# Patient Record
Sex: FEMALE | Race: WHITE | NOT HISPANIC OR LATINO | Employment: OTHER | URBAN - METROPOLITAN AREA
[De-identification: names, ages, dates, MRNs, and addresses within clinical notes are randomized per-mention and may not be internally consistent; named-entity substitution may affect disease eponyms.]

---

## 2020-07-29 ENCOUNTER — APPOINTMENT (EMERGENCY)
Dept: RADIOLOGY | Facility: HOSPITAL | Age: 85
DRG: 551 | End: 2020-07-29
Payer: MEDICARE

## 2020-07-29 ENCOUNTER — HOSPITAL ENCOUNTER (INPATIENT)
Facility: HOSPITAL | Age: 85
LOS: 6 days | Discharge: HOME | DRG: 551 | End: 2020-08-05
Attending: EMERGENCY MEDICINE | Admitting: SURGERY
Payer: MEDICARE

## 2020-07-29 DIAGNOSIS — S22.080A COMPRESSION FRACTURE OF T12 VERTEBRA, INITIAL ENCOUNTER (HCC): ICD-10-CM

## 2020-07-29 DIAGNOSIS — S22.42XA CLOSED FRACTURE OF MULTIPLE RIBS OF LEFT SIDE: ICD-10-CM

## 2020-07-29 DIAGNOSIS — J94.8 HYDROPNEUMOTHORAX: ICD-10-CM

## 2020-07-29 DIAGNOSIS — S22.42XA CLOSED FRACTURE OF MULTIPLE RIBS OF LEFT SIDE, INITIAL ENCOUNTER: Primary | ICD-10-CM
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
## 2020-07-29 LAB
ANION GAP SERPL CALCULATED.3IONS-SCNC: 3 MMOL/L (ref 4–13)
BASOPHILS # BLD AUTO: 0.08 THOUSANDS/ΜL (ref 0–0.1)
BASOPHILS NFR BLD AUTO: 1 % (ref 0–1)
BUN SERPL-MCNC: 13 MG/DL (ref 5–25)
CALCIUM SERPL-MCNC: 9 MG/DL (ref 8.3–10.1)
CHLORIDE SERPL-SCNC: 104 MMOL/L (ref 100–108)
CO2 SERPL-SCNC: 32 MMOL/L (ref 21–32)
CREAT SERPL-MCNC: 0.9 MG/DL (ref 0.6–1.3)
EOSINOPHIL # BLD AUTO: 0.39 THOUSAND/ΜL (ref 0–0.61)
EOSINOPHIL NFR BLD AUTO: 4 % (ref 0–6)
ERYTHROCYTE [DISTWIDTH] IN BLOOD BY AUTOMATED COUNT: 13.8 % (ref 11.6–15.1)
GFR SERPL CREATININE-BSD FRML MDRD: 59 ML/MIN/1.73SQ M
GLUCOSE SERPL-MCNC: 112 MG/DL (ref 65–140)
HCT VFR BLD AUTO: 39.7 % (ref 34.8–46.1)
HGB BLD-MCNC: 13.1 G/DL (ref 11.5–15.4)
IMM GRANULOCYTES # BLD AUTO: 0.07 THOUSAND/UL (ref 0–0.2)
IMM GRANULOCYTES NFR BLD AUTO: 1 % (ref 0–2)
LYMPHOCYTES # BLD AUTO: 1.42 THOUSANDS/ΜL (ref 0.6–4.47)
LYMPHOCYTES NFR BLD AUTO: 13 % (ref 14–44)
MCH RBC QN AUTO: 31 PG (ref 26.8–34.3)
MCHC RBC AUTO-ENTMCNC: 33 G/DL (ref 31.4–37.4)
MCV RBC AUTO: 94 FL (ref 82–98)
MONOCYTES # BLD AUTO: 0.76 THOUSAND/ΜL (ref 0.17–1.22)
MONOCYTES NFR BLD AUTO: 7 % (ref 4–12)
NEUTROPHILS # BLD AUTO: 8.07 THOUSANDS/ΜL (ref 1.85–7.62)
NEUTS SEG NFR BLD AUTO: 74 % (ref 43–75)
NRBC BLD AUTO-RTO: 0 /100 WBCS
PLATELET # BLD AUTO: 283 THOUSANDS/UL (ref 149–390)
PMV BLD AUTO: 10.3 FL (ref 8.9–12.7)
POTASSIUM SERPL-SCNC: 4.2 MMOL/L (ref 3.5–5.3)
RBC # BLD AUTO: 4.22 MILLION/UL (ref 3.81–5.12)
SODIUM SERPL-SCNC: 139 MMOL/L (ref 136–145)
WBC # BLD AUTO: 10.79 THOUSAND/UL (ref 4.31–10.16)

## 2020-07-29 PROCEDURE — 80048 BASIC METABOLIC PNL TOTAL CA: CPT | Performed by: EMERGENCY MEDICINE

## 2020-07-29 PROCEDURE — 99284 EMERGENCY DEPT VISIT MOD MDM: CPT | Performed by: EMERGENCY MEDICINE

## 2020-07-29 PROCEDURE — 96375 TX/PRO/DX INJ NEW DRUG ADDON: CPT

## 2020-07-29 PROCEDURE — 1123F ACP DISCUSS/DSCN MKR DOCD: CPT | Performed by: ANESTHESIOLOGY

## 2020-07-29 PROCEDURE — 36415 COLL VENOUS BLD VENIPUNCTURE: CPT | Performed by: EMERGENCY MEDICINE

## 2020-07-29 PROCEDURE — 74177 CT ABD & PELVIS W/CONTRAST: CPT

## 2020-07-29 PROCEDURE — 85025 COMPLETE CBC W/AUTO DIFF WBC: CPT | Performed by: EMERGENCY MEDICINE

## 2020-07-29 PROCEDURE — 99285 EMERGENCY DEPT VISIT HI MDM: CPT

## 2020-07-29 PROCEDURE — 96374 THER/PROPH/DIAG INJ IV PUSH: CPT

## 2020-07-29 PROCEDURE — 71260 CT THORAX DX C+: CPT

## 2020-07-29 RX ORDER — LIDOCAINE 50 MG/G
1 PATCH TOPICAL ONCE
Status: COMPLETED | OUTPATIENT
Start: 2020-07-29 | End: 2020-07-30

## 2020-07-29 RX ORDER — MORPHINE SULFATE 4 MG/ML
4 INJECTION, SOLUTION INTRAMUSCULAR; INTRAVENOUS ONCE
Status: COMPLETED | OUTPATIENT
Start: 2020-07-29 | End: 2020-07-29

## 2020-07-29 RX ORDER — HYDROMORPHONE HCL/PF 1 MG/ML
0.5 SYRINGE (ML) INJECTION ONCE
Status: COMPLETED | OUTPATIENT
Start: 2020-07-29 | End: 2020-07-29

## 2020-07-29 RX ADMIN — IOHEXOL 100 ML: 350 INJECTION, SOLUTION INTRAVENOUS at 23:30

## 2020-07-29 RX ADMIN — HYDROMORPHONE HYDROCHLORIDE 0.5 MG: 1 INJECTION, SOLUTION INTRAMUSCULAR; INTRAVENOUS; SUBCUTANEOUS at 23:09

## 2020-07-29 RX ADMIN — MORPHINE SULFATE 4 MG: 4 INJECTION INTRAVENOUS at 21:47

## 2020-07-29 RX ADMIN — LIDOCAINE 1 PATCH: 50 PATCH CUTANEOUS at 23:09

## 2020-07-30 ENCOUNTER — HOSPITAL ENCOUNTER (INPATIENT)
Dept: SURGERY | Facility: HOSPITAL | Age: 85
Discharge: HOME | DRG: 551 | End: 2020-07-30
Payer: MEDICARE

## 2020-07-30 ENCOUNTER — ANESTHESIA (INPATIENT)
Dept: ANESTHESIOLOGY | Facility: HOSPITAL | Age: 85
DRG: 551 | End: 2020-07-30
Payer: MEDICARE

## 2020-07-30 ENCOUNTER — ANESTHESIA EVENT (INPATIENT)
Dept: ANESTHESIOLOGY | Facility: HOSPITAL | Age: 85
DRG: 551 | End: 2020-07-30
Payer: MEDICARE

## 2020-07-30 PROBLEM — S22.080A CLOSED WEDGE COMPRESSION FRACTURE OF T12 VERTEBRA (HCC): Status: ACTIVE | Noted: 2020-07-30

## 2020-07-30 PROBLEM — G89.11 ACUTE PAIN DUE TO TRAUMA: Status: ACTIVE | Noted: 2020-07-30

## 2020-07-30 PROBLEM — S22.42XA CLOSED FRACTURE OF MULTIPLE RIBS OF LEFT SIDE: Status: ACTIVE | Noted: 2020-07-30
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
## 2020-07-30 PROBLEM — I10 HYPERTENSION: Chronic | Status: ACTIVE | Noted: 2020-07-30

## 2020-07-30 PROBLEM — J94.2 HEMOPNEUMOTHORAX ON LEFT: Status: ACTIVE | Noted: 2020-07-30

## 2020-07-30 LAB
ANION GAP SERPL CALCULATED.3IONS-SCNC: 5 MMOL/L (ref 4–13)
BUN SERPL-MCNC: 13 MG/DL (ref 5–25)
CALCIUM SERPL-MCNC: 9.2 MG/DL (ref 8.3–10.1)
CHLORIDE SERPL-SCNC: 105 MMOL/L (ref 100–108)
CO2 SERPL-SCNC: 27 MMOL/L (ref 21–32)
CREAT SERPL-MCNC: 0.78 MG/DL (ref 0.6–1.3)
ERYTHROCYTE [DISTWIDTH] IN BLOOD BY AUTOMATED COUNT: 13.8 % (ref 11.6–15.1)
GFR SERPL CREATININE-BSD FRML MDRD: 70 ML/MIN/1.73SQ M
GLUCOSE SERPL-MCNC: 128 MG/DL (ref 65–140)
HCT VFR BLD AUTO: 32.4 % (ref 34.8–46.1)
HGB BLD-MCNC: 10.8 G/DL (ref 11.5–15.4)
MCH RBC QN AUTO: 31.5 PG (ref 26.8–34.3)
MCHC RBC AUTO-ENTMCNC: 33.3 G/DL (ref 31.4–37.4)
MCV RBC AUTO: 95 FL (ref 82–98)
PLATELET # BLD AUTO: 202 THOUSANDS/UL (ref 149–390)
PMV BLD AUTO: 10.1 FL (ref 8.9–12.7)
POTASSIUM SERPL-SCNC: 4.4 MMOL/L (ref 3.5–5.3)
RBC # BLD AUTO: 3.43 MILLION/UL (ref 3.81–5.12)
SARS-COV-2 RNA RESP QL NAA+PROBE: NEGATIVE
SODIUM SERPL-SCNC: 137 MMOL/L (ref 136–145)
WBC # BLD AUTO: 7.82 THOUSAND/UL (ref 4.31–10.16)

## 2020-07-30 PROCEDURE — 85027 COMPLETE CBC AUTOMATED: CPT | Performed by: SURGERY

## 2020-07-30 PROCEDURE — 99222 1ST HOSP IP/OBS MODERATE 55: CPT | Performed by: NURSE PRACTITIONER

## 2020-07-30 PROCEDURE — 3E0R3BZ INTRODUCTION OF ANESTHETIC AGENT INTO SPINAL CANAL, PERCUTANEOUS APPROACH: ICD-10-PCS | Performed by: SURGERY

## 2020-07-30 PROCEDURE — 99222 1ST HOSP IP/OBS MODERATE 55: CPT | Performed by: INTERNAL MEDICINE

## 2020-07-30 PROCEDURE — U0003 INFECTIOUS AGENT DETECTION BY NUCLEIC ACID (DNA OR RNA); SEVERE ACUTE RESPIRATORY SYNDROME CORONAVIRUS 2 (SARS-COV-2) (CORONAVIRUS DISEASE [COVID-19]), AMPLIFIED PROBE TECHNIQUE, MAKING USE OF HIGH THROUGHPUT TECHNOLOGIES AS DESCRIBED BY CMS-2020-01-R: HCPCS | Performed by: SURGERY

## 2020-07-30 PROCEDURE — 99222 1ST HOSP IP/OBS MODERATE 55: CPT | Performed by: SURGERY

## 2020-07-30 PROCEDURE — 90715 TDAP VACCINE 7 YRS/> IM: CPT | Performed by: SURGERY

## 2020-07-30 PROCEDURE — 80048 BASIC METABOLIC PNL TOTAL CA: CPT | Performed by: SURGERY

## 2020-07-30 PROCEDURE — 36415 COLL VENOUS BLD VENIPUNCTURE: CPT | Performed by: SURGERY

## 2020-07-30 PROCEDURE — 94760 N-INVAS EAR/PLS OXIMETRY 1: CPT

## 2020-07-30 PROCEDURE — NC001 PR NO CHARGE: Performed by: SURGERY

## 2020-07-30 PROCEDURE — 00HU33Z INSERTION OF INFUSION DEVICE INTO SPINAL CANAL, PERCUTANEOUS APPROACH: ICD-10-PCS | Performed by: SURGERY

## 2020-07-30 RX ORDER — GABAPENTIN 100 MG/1
100 CAPSULE ORAL
Status: DISCONTINUED | OUTPATIENT
Start: 2020-07-30 | End: 2020-07-31

## 2020-07-30 RX ORDER — HYDROMORPHONE HCL/PF 1 MG/ML
0.2 SYRINGE (ML) INJECTION EVERY 4 HOURS PRN
Status: DISCONTINUED | OUTPATIENT
Start: 2020-07-30 | End: 2020-08-05 | Stop reason: HOSPADM

## 2020-07-30 RX ORDER — HEPARIN SODIUM 5000 [USP'U]/ML
5000 INJECTION, SOLUTION INTRAVENOUS; SUBCUTANEOUS EVERY 8 HOURS SCHEDULED
Status: DISPENSED | OUTPATIENT
Start: 2020-07-30 | End: 2020-08-02

## 2020-07-30 RX ORDER — DIPHENHYDRAMINE HYDROCHLORIDE 50 MG/ML
12.5 INJECTION INTRAMUSCULAR; INTRAVENOUS EVERY 6 HOURS PRN
Status: DISCONTINUED | OUTPATIENT
Start: 2020-07-30 | End: 2020-08-03

## 2020-07-30 RX ORDER — LIDOCAINE HYDROCHLORIDE AND EPINEPHRINE 15; 5 MG/ML; UG/ML
INJECTION, SOLUTION EPIDURAL AS NEEDED
Status: DISCONTINUED | OUTPATIENT
Start: 2020-07-30 | End: 2020-07-30 | Stop reason: HOSPADM

## 2020-07-30 RX ORDER — CELECOXIB 200 MG/1
200 CAPSULE ORAL 2 TIMES DAILY
Status: DISCONTINUED | OUTPATIENT
Start: 2020-07-30 | End: 2020-08-05 | Stop reason: HOSPADM

## 2020-07-30 RX ORDER — MIDAZOLAM HYDROCHLORIDE 2 MG/2ML
INJECTION, SOLUTION INTRAMUSCULAR; INTRAVENOUS
Status: DISPENSED
Start: 2020-07-30 | End: 2020-07-31

## 2020-07-30 RX ORDER — HYDROMORPHONE HCL/PF 1 MG/ML
0.2 SYRINGE (ML) INJECTION EVERY 4 HOURS PRN
Status: DISCONTINUED | OUTPATIENT
Start: 2020-07-30 | End: 2020-07-30

## 2020-07-30 RX ORDER — OXYCODONE HYDROCHLORIDE 5 MG/1
5 TABLET ORAL EVERY 4 HOURS PRN
Status: DISCONTINUED | OUTPATIENT
Start: 2020-07-30 | End: 2020-07-30

## 2020-07-30 RX ORDER — ACETAMINOPHEN 325 MG/1
650 TABLET ORAL EVERY 6 HOURS PRN
Status: DISCONTINUED | OUTPATIENT
Start: 2020-07-30 | End: 2020-08-03

## 2020-07-30 RX ORDER — SODIUM CHLORIDE, SODIUM LACTATE, POTASSIUM CHLORIDE, CALCIUM CHLORIDE 600; 310; 30; 20 MG/100ML; MG/100ML; MG/100ML; MG/100ML
INJECTION, SOLUTION INTRAVENOUS CONTINUOUS PRN
Status: DISCONTINUED | OUTPATIENT
Start: 2020-07-30 | End: 2020-07-30 | Stop reason: HOSPADM

## 2020-07-30 RX ORDER — FENTANYL CITRATE 50 UG/ML
INJECTION, SOLUTION INTRAMUSCULAR; INTRAVENOUS AS NEEDED
Status: DISCONTINUED | OUTPATIENT
Start: 2020-07-30 | End: 2020-07-30 | Stop reason: HOSPADM

## 2020-07-30 RX ORDER — ATORVASTATIN CALCIUM 20 MG/1
20 TABLET, FILM COATED ORAL
Status: DISCONTINUED | OUTPATIENT
Start: 2020-07-30 | End: 2020-08-05 | Stop reason: HOSPADM

## 2020-07-30 RX ORDER — METHOCARBAMOL 500 MG/1
500 TABLET, FILM COATED ORAL ONCE
Status: COMPLETED | OUTPATIENT
Start: 2020-07-30 | End: 2020-07-31

## 2020-07-30 RX ORDER — HYDROMORPHONE HCL/PF 1 MG/ML
1 SYRINGE (ML) INJECTION ONCE
Status: COMPLETED | OUTPATIENT
Start: 2020-07-30 | End: 2020-07-30

## 2020-07-30 RX ORDER — AMLODIPINE BESYLATE 5 MG/1
5 TABLET ORAL DAILY
Status: DISCONTINUED | OUTPATIENT
Start: 2020-07-30 | End: 2020-08-05 | Stop reason: HOSPADM

## 2020-07-30 RX ORDER — FENTANYL CITRATE 50 UG/ML
INJECTION, SOLUTION INTRAMUSCULAR; INTRAVENOUS
Status: COMPLETED
Start: 2020-07-30 | End: 2020-07-30

## 2020-07-30 RX ORDER — OXYCODONE HCL 5 MG/5 ML
2.5 SOLUTION, ORAL ORAL EVERY 4 HOURS PRN
Status: DISCONTINUED | OUTPATIENT
Start: 2020-07-30 | End: 2020-07-30

## 2020-07-30 RX ORDER — ROPIVACAINE HYDROCHLORIDE 2 MG/ML
INJECTION, SOLUTION EPIDURAL; INFILTRATION; PERINEURAL AS NEEDED
Status: DISCONTINUED | OUTPATIENT
Start: 2020-07-30 | End: 2020-07-30 | Stop reason: HOSPADM

## 2020-07-30 RX ADMIN — CELECOXIB 200 MG: 200 CAPSULE ORAL at 21:40

## 2020-07-30 RX ADMIN — ATORVASTATIN CALCIUM 20 MG: 20 TABLET, FILM COATED ORAL at 21:39

## 2020-07-30 RX ADMIN — OXYCODONE HYDROCHLORIDE 5 MG: 5 TABLET ORAL at 12:08

## 2020-07-30 RX ADMIN — GABAPENTIN 100 MG: 100 CAPSULE ORAL at 21:39

## 2020-07-30 RX ADMIN — TETANUS TOXOID, REDUCED DIPHTHERIA TOXOID AND ACELLULAR PERTUSSIS VACCINE, ADSORBED 0.5 ML: 5; 2.5; 8; 8; 2.5 SUSPENSION INTRAMUSCULAR at 04:44

## 2020-07-30 RX ADMIN — HYDROMORPHONE HYDROCHLORIDE 1 MG: 1 INJECTION, SOLUTION INTRAMUSCULAR; INTRAVENOUS; SUBCUTANEOUS at 02:03

## 2020-07-30 RX ADMIN — FENTANYL CITRATE 50 MCG: 50 INJECTION, SOLUTION INTRAMUSCULAR; INTRAVENOUS at 12:40

## 2020-07-30 RX ADMIN — HYDROMORPHONE HYDROCHLORIDE 0.2 MG: 1 INJECTION, SOLUTION INTRAMUSCULAR; INTRAVENOUS; SUBCUTANEOUS at 09:52

## 2020-07-30 RX ADMIN — HEPARIN SODIUM 5000 UNITS: 5000 INJECTION INTRAVENOUS; SUBCUTANEOUS at 21:40

## 2020-07-30 RX ADMIN — DIPHENHYDRAMINE HYDROCHLORIDE 12.5 MG: 50 INJECTION, SOLUTION INTRAMUSCULAR; INTRAVENOUS at 17:42

## 2020-07-30 RX ADMIN — ROPIVACAINE HYDROCHLORIDE 5 ML: 2 INJECTION, SOLUTION EPIDURAL; INFILTRATION at 13:05

## 2020-07-30 RX ADMIN — Medication: at 15:47

## 2020-07-30 RX ADMIN — SODIUM CHLORIDE, SODIUM LACTATE, POTASSIUM CHLORIDE, AND CALCIUM CHLORIDE: .6; .31; .03; .02 INJECTION, SOLUTION INTRAVENOUS at 12:35

## 2020-07-30 RX ADMIN — LIDOCAINE HYDROCHLORIDE AND EPINEPHRINE 3 ML: 15; 5 INJECTION, SOLUTION EPIDURAL at 13:00

## 2020-07-30 NOTE — H&P
H&P Exam - Trauma   Herbert Meneses 80 y o  female MRN: [id number]  Unit/Bed#: ED 26 Encounter: [id number]    Assessment/Plan   Trauma Alert: Evaluation  Model of Arrival: Ambulance  Trauma Team: Attending Hector Subramanian, Residents Caridad Page and Fellow Joe Corcoran  Consultants: Neurosurgery, Adult Pain Service, Geriatrics  Trauma Active Problems:     Rib fractures'  -patient has multiple displaced rib fractures, in adjacent ribs  -notes pain with deep inspiration-maximum incentive spirometry of 500  -patient chronically takes pain medication  -Patient on occasional aspirin    Compression fracture of T12 vertebra  -presumed traumatic in secondary to same fall and sedated rib fracture  -minimal back pain  -no bony tenderness on palpation her spine    Hydropneumothorax  -stable small collection noted      Trauma Plan:   -will admit to step-down level 2  -Multimodal pain regimen for rib fractures  -will consult acute pain service, recommendations appreciated  -encourage incentive spirometry  -will monitor lung function  -a m  Chest x-ray to assess lung volumes and progression of hydropneumothorax  -no need for chest tube at this time  -neuro surgery to be consulted for spinal compression fracture    Chief Complaint:  Chest wall pain with respiration    History of Present Illness   HPI:  Herbert Meneses is a 80 y o  female who presents via EMS after falling today at her daughter's home  She states that she has been home bound for the past 2 years, and was visiting her daughter  She routinely walks with ambulatory aid from a cane, but made no attempt to go to the bathroom without use of her cane, at which point she fell down a number of steps and then complained of left-sided chest pain  She notes that the pain is worse with respiration  She states that she did not feel dizzy or lightheaded at the time of her fall, did not strike her head and did not lose consciousness    She does take baby aspirin fairly routinely, but no other blood thinning medications  She has a history of gabapentin use and occasionally takes tramadol as well  Presently she is alert oriented and able to relate the details of her medical history and the events surrounding this fall clearly to me  Her daughter was also present at the time that she fell and corroborates her account of the fall  Mechanism:Fall    Review of Systems   Respiratory:        Chest pain with respiration   Cardiovascular: Positive for leg swelling  Musculoskeletal: Positive for arthralgias and gait problem  12-point, complete review of systems was reviewed and negative except as stated above  Historical Information   The patient is a reliable historian, and her daughter is also present at bedside relate some details the events surrounding her fall  History reviewed  No pertinent past medical history  Past Surgical History:   Procedure Laterality Date    JOINT REPLACEMENT       Social History   Social History     Substance and Sexual Activity   Alcohol Use Never    Frequency: Never     Social History     Substance and Sexual Activity   Drug Use Never     Social History     Tobacco Use   Smoking Status Never Smoker   Smokeless Tobacco Never Used     E-Cigarette/Vaping    E-Cigarette Use Never User      E-Cigarette/Vaping Substances       There is no immunization history on file for this patient    Last Tetanus:  Patient unaware of last Tdap  Family History: Non-contributory  Unable to obtain/limited by not limited      Meds/Allergies   all current active meds have been reviewed    No Known Allergies      PHYSICAL EXAM    PE limited by:  Not limited    Objective   Vitals:   First set: Temperature: 97 7 °F (36 5 °C) (07/29/20 2101)  Pulse: 89 (07/29/20 2101)  Respirations: 18 (07/29/20 2101)  Blood Pressure: 129/89 (07/29/20 2101)    Primary Survey:   (A) Airway:  Intact  (B) Breathing:  Bilateral breath sounds  (C) Circulation: Pulses:   carotid  4/4, pedal  4/4, radial 4/4 and femoral  4/4  (D) Disabliity:  GCS Total:  15  (E) Expose:  Completed    Secondary Survey: (Click on Physical Exam tab above)  Physical Exam   Constitutional: She is oriented to person, place, and time  She appears well-developed  No distress  HENT:   Head: Normocephalic and atraumatic  Eyes: Pupils are equal, round, and reactive to light  EOM are normal    Neck: Normal range of motion  Neck supple  Cardiovascular: Normal rate, regular rhythm and normal heart sounds  No murmur heard  Pulses:       Radial pulses are 2+ on the right side, and 2+ on the left side  Femoral pulses are 2+ on the right side, and 2+ on the left side  Popliteal pulses are 2+ on the right side, and 2+ on the left side  Dorsalis pedis pulses are 1+ on the right side, and 1+ on the left side  Pulmonary/Chest: Effort normal and breath sounds normal  No respiratory distress  No evident chest wall bruising    Chest wall tender to palpation    Abdominal: Soft  She exhibits no distension  There is no tenderness  Neurological: She is alert and oriented to person, place, and time  No cranial nerve deficit or sensory deficit  Coordination normal    Skin: Skin is warm and dry  Capillary refill takes 2 to 3 seconds  Lesion noted  Numerous seborrheic keratoses of the trunk       Psychiatric: She has a normal mood and affect         Invasive Devices     Peripheral Intravenous Line            Peripheral IV 07/29/20 Right Antecubital less than 1 day                 Lab Results:   BMP/CMP:   Lab Results   Component Value Date    SODIUM 139 07/29/2020    K 4 2 07/29/2020     07/29/2020    CO2 32 07/29/2020    BUN 13 07/29/2020    CREATININE 0 90 07/29/2020    CALCIUM 9 0 07/29/2020    EGFR 59 07/29/2020   , CBC:   Lab Results   Component Value Date    WBC 10 79 (H) 07/29/2020    HGB 13 1 07/29/2020    HCT 39 7 07/29/2020    MCV 94 07/29/2020     07/29/2020    MCH 31 0 07/29/2020    MCHC 33 0 07/29/2020 RDW 13 8 07/29/2020    MPV 10 3 07/29/2020    NRBC 0 07/29/2020   , Coagulation: No results found for: PT, INR, APTT, Lactate: No results found for: LACTATE, Amylase: No results found for: AMYLASE, Lipase: No results found for: LIPASE, AST: No results found for: AST, ALT: No results found for: ALT, Urinalysis: No results found for: Aziza Blake, SPECGRAV, PHUR, LEUKOCYTESUR, NITRITE, PROTEINUA, GLUCOSEU, KETONESU, BILIRUBINUR, BLOODU, CK: No results found for: CKTOTAL, Troponin: No results found for: TROPONINI and ISTAT: No components found for: VBG  Imaging/EKG Studies: Results: I have personally reviewed pertinent reports  and I have personally reviewed pertinent films in PACS   Ct Chest Abdomen Pelvis W Contrast    Result Date: 7/29/2020  Impression: Tiny left-sided hydropneumothorax  Multiple left-sided rib fractures as described above   Compression deformity of T12 vertebral body  I personally discussed this study with Dr Mamta López on 7/29/2020 at 11:50 PM  Workstation performed: [id number]     Other Studies:  None    Code Status: No Order  Advance Directive and Living Will:      Power of :    POLST:

## 2020-07-30 NOTE — DISCHARGE INSTRUCTIONS
Traumatic Rib Fracture Discharge Instructions: Activity:  - PT and OT evaluation and treatment as indicated  - Walking and normal light activities are encouraged  Normal daily activities including climbing steps are okay  - Avoid lifting greater than 10 pounds, any strenuous activities and/or exercise, and contact sports until cleared by the trauma service  - Continue using the incentive spirometer at least 10 times every hour while awake  Medications:    - You may resume all of your regular medications, including blood thinners and aspirin, after discharge unless otherwise instructed  Please refer to your discharge medication list for further details  - Please take the pain medications as directed  - You are encouraged to use non-narcotic pain medications first and whenever possible  Reserve the use of narcotic pain medication for moderate to severe pain not controlled by non-narcotic medications   - No driving while taking narcotic pain medications  - You may become constipated, especially if taking pain medications  You may take any over the counter stool softeners or laxatives as needed  Examples: Milk of Magnesia, Colace, Senna  Additional Instructions:  - If you have any questions or concerns after discharge please call the office   - Call office or return to ER if fever greater than 101, chills, worsening/uncontrollable pain, develop productive cough, increasing shortness of breath, and/or difficulty breathing  Neurosurgery discharge instructions following spine fracture:      TLSO brace to be worn when out of bed of head of bed greater than 45 degrees  May place brace on while sitting on edge of bed  May be removed for showering   No bending, twisting or heavy lifting  No strenuous activities  No Driving   Follow-up as scheduled in two weeks with repeat spine x-rays to be completed 2-3 days prior to visit  Prescription has been entered electronically        **Please notify MD immediately if you have increased back or leg pain  New numbness, tingling and/or weakness in your legs  **

## 2020-07-30 NOTE — PLAN OF CARE
Problem: Potential for Falls  Goal: Patient will remain free of falls  Description  INTERVENTIONS:  - Assess patient frequently for physical needs  -  Identify cognitive and physical deficits and behaviors that affect risk of falls    -  [place] fall precautions as indicated by assessment   - Educate patient/family on patient safety including physical limitations  - Instruct patient to call for assistance with activity based on assessment  - Modify environment to reduce risk of injury  - Consider OT/PT consult to assist with strengthening/mobility  Outcome: Progressing     Problem: PAIN - ADULT  Goal: Verbalizes/displays adequate comfort level or baseline comfort level  Description  Interventions:  - Encourage patient to monitor pain and request assistance  - Assess pain using appropriate pain scale  - Administer analgesics based on type and severity of pain and evaluate response  - Implement non-pharmacological measures as appropriate and evaluate response  - Consider cultural and social influences on pain and pain management  - Notify physician/advanced practitioner if interventions unsuccessful or patient reports new pain  Outcome: Progressing     Problem: INFECTION - ADULT  Goal: Absence or prevention of progression during hospitalization  Description  INTERVENTIONS:  - Assess and monitor for signs and symptoms of infection  - Monitor lab/diagnostic results  - Monitor all insertion sites, i e  indwelling lines, tubes, and drains  - Monitor endotracheal if appropriate and nasal secretions for changes in amount and color  - [place] appropriate cooling/warming therapies per order  - Administer medications as ordered  - Instruct and encourage patient and family to use good hand hygiene technique  - Identify and instruct in appropriate isolation precautions for identified infection/condition  Outcome: Progressing  Goal: Absence of fever/infection during neutropenic period  Description  INTERVENTIONS:  - Monitor WBC    Outcome: Progressing     Problem: SAFETY ADULT  Goal: Maintain or return to baseline ADL function  Description  INTERVENTIONS:  -  Assess patient's ability to carry out ADLs; assess patient's baseline for ADL function and identify physical deficits which impact ability to perform ADLs (bathing, care of mouth/teeth, toileting, grooming, dressing, etc )  - Assess/evaluate cause of self-care deficits   - Assess range of motion  - Assess patient's mobility; develop plan if impaired  - Assess patient's need for assistive devices and provide as appropriate  - Encourage maximum independence but intervene and supervise when necessary  - Involve family in performance of ADLs  - Assess for home care needs following discharge   - Consider OT consult to assist with ADL evaluation and planning for discharge  - Provide patient education as appropriate  Outcome: Progressing  Goal: Maintain or return mobility status to optimal level  Description  INTERVENTIONS:  - Assess patient's baseline mobility status (ambulation, transfers, stairs, etc )    - Identify cognitive and physical deficits and behaviors that affect mobility  - Identify mobility aids required to assist with transfers and/or ambulation (gait belt, sit-to-stand, lift, walker, cane, etc )  - [place] fall precautions as indicated by assessment  - Record patient progress and toleration of activity level on Mobility SBAR; progress patient to next Phase/Stage  - Instruct patient to call for assistance with activity based on assessment  - Consider rehabilitation consult to assist with strengthening/weightbearing, etc   Outcome: Progressing     Problem: DISCHARGE PLANNING  Goal: Discharge to home or other facility with appropriate resources  Description  INTERVENTIONS:  - Identify barriers to discharge w/patient and caregiver  - Arrange for needed discharge resources and transportation as appropriate  - Identify discharge learning needs (meds, wound care, etc )  - Arrange for interpretive services to assist at discharge as needed  - Refer to Case Management Department for coordinating discharge planning if the patient needs post-hospital services based on physician/advanced practitioner order or complex needs related to functional status, cognitive ability, or social support system  Outcome: Progressing     Problem: Knowledge Deficit  Goal: Patient/family/caregiver demonstrates understanding of disease process, treatment plan, medications, and discharge instructions  Description  Complete learning assessment and assess knowledge base    Interventions:  - Provide teaching at level of understanding  - Provide teaching via preferred learning methods  Outcome: Progressing

## 2020-07-30 NOTE — ASSESSMENT & PLAN NOTE
S/p mechanical fall down 1 step striking her back on a piece of furniture  Patient reports taking baby aspirin daily but has roughly stopped for a week  Imaging:    CT chest abdomen pelvis 07/29/2020:Tiny left-sided hydropneumothorax  Multiple left-sided rib fractures as described above  Compression deformity of T12 vertebral body  Plan:  · Continue neurological exam  · Reviewed imaging with patient and daughter at bedside  · TLSO brace ordered to be worn when OOB or HOB >45 degrees, if patient unable to tolerate can wear brace prn for comfort secondary to multiple left-sided rib fractures  · Ordered upright thoracolumbar spine XR AP/lateral  · No neurosurgical intervention warranted at this time  · Medical management and pain control per primary team  · Mobilize patient as tolerated after fitted for a brace  · PT/OT eval  · DVT PPX: SCDs and cleared from neurosurgical standpoint for pharmacological DVT prophylaxis    Neurosurgery will continue to follow, pending thoracolumbar spine x-ray, call with any questions or concerns

## 2020-07-30 NOTE — RESPIRATORY THERAPY NOTE
RT Protocol Note  Herbert Meneses 80 y o  female MRN: [id number]  Unit/Bed#: [facility] Encounter: [id number]    Assessment    Principal Problem:    Closed fracture of multiple ribs of left side  Active Problems:    Fall    Hemopneumothorax on left    Closed wedge compression fracture of T12 vertebra (HCC)    Acute pain due to trauma    Hypertension      Home Pulmonary Medications:  n/a       Past Medical History:   Diagnosis Date    Hypercholesteremia     Hypertension     Spinal stenosis      Social History     Socioeconomic History    Marital status: /Civil Union     Spouse name: None    Number of children: None    Years of education: None    Highest education level: None   Occupational History    None   Social Needs    Financial resource strain: None    Food insecurity:     Worry: None     Inability: None    Transportation needs:     Medical: None     Non-medical: None   Tobacco Use    Smoking status: Never Smoker    Smokeless tobacco: Never Used   Substance and Sexual Activity    Alcohol use: Never     Frequency: Never    Drug use: Never    Sexual activity: None   Lifestyle    Physical activity:     Days per week: None     Minutes per session: None    Stress: None   Relationships    Social connections:     Talks on phone: None     Gets together: None     Attends [employer] service: None     Active member of club or organization: None     Attends meetings of clubs or organizations: None     Relationship status: None    Intimate partner violence:     Fear of current or ex partner: None     Emotionally abused: None     Physically abused: None     Forced sexual activity: None   Other Topics Concern    None   Social History Narrative    None       Subjective         Objective    Physical Exam:        Vitals:  Blood pressure 139/74, pulse 76, temperature 97 7 °F (36 5 °C), temperature source Oral, resp  rate 16, height 5' 6" (1 676 m), weight 54 4 kg (120 lb), SpO2 97 %            Imaging and other studies: I have personally reviewed pertinent reports  Plan       Airway Clearance Plan: Incentive Spirometer     Resp Comments: Pt with multiple closed rib fractures of the left side, will require IS for airway clearance  Will d/c positive expiratory pressure PEP since there is no indication for that therapy modality and will start Incentive Spirometry instead

## 2020-07-30 NOTE — ORTHOTIC NOTE
Orthotic Note            Date: 7/30/2020      Patient Name: Jamie Aguayo            Reason for Consult:  Patient Active Problem List   Diagnosis    Fall    Closed fracture of multiple ribs of left side    Hemopneumothorax on left    Closed wedge compression fracture of T12 vertebra (HCC)    Acute pain due to trauma    Hypertension     [place] 456 Backpack TLSO    I measured, fit, and delivered a Large TLSO while patient was supine in bed  Pt pain was not controlled and prevented donning the brace  Instructions/adjustments reviewed while family present in room x's 3  [employer] will continue daily follow up  RN aware and Madonna Ruelas will continue daily follow up  Recommendations:  Please call Mobility Coordinator at ext  [phone] in regards to bracing instruction and/or adjustment    Zulma Zheng Restorative Technician, BS

## 2020-07-30 NOTE — ASSESSMENT & PLAN NOTE
- Small left-sided hemopneumothorax, present on admission   - No intervention necessary at this time, and no intervention anticipated  - Await repeat chest x-ray today for further evaluation of small left-sided hemopneumothorax  - Management of rib fractures as noted

## 2020-07-30 NOTE — CONSULTS
Rib Fracture Consultation - Acute Pain Service   Adarsh Burns 80 y o  female MRN: [id number]  Unit/Bed#: ED 26 Encounter: [id number]               Assessment/Plan     Assessment:      Adarsh Burns is a 80y o  year old female with past medical history significant for hypertension, hyperlipidemia, osteoarthritis with multiple hip surgeries and femoral extension on chronic therapy with tramadol and Celebrex who presents after fall down 1 stair, landing on furniture leading to multiple rib fractures and T12 compression injury  Acute pain service consulted for evaluation of rib fracture and consideration for epidural   Risks and benefits discussed with patient and daughter and patient is agreeable to move forward with epidural placement  Plan:   - Acetaminophen 650 mg p o  Q 6 hours p r n  For mild pain  - Oxycodone 2 5/5 mg p o  Q 4 hours p r n  For moderate/severe pain  - Hydromorphone 0 2 mg IV q 4 hours p r n  for breakthrough pain  - Gabapentin 100 mg p o  HS  - Lidocaine patch  - recommend restarting home Celebrex 100 mg p o  B i d  - ok per anesthesiology once epidural placed  - would not order home tramadol  - will move forward with epidural placement     APS will continue to follow  Please call  / [phone] or [facility] (/ between [phone] and on weekends) with questions or concerns    Plan discussed with primary team    History of Present Illness    Admit Date:  7/29/2020  Hospital Day:  0 days  Primary Service:  Emergency Medicine  Attending Provider:  Simone Farias MD  Reason for Consult / Principal Problem: Acute/Chronic Pain  HPI: Adarsh Burns is a 80 y o  female with past medical history significant for hypertension, hyperlipidemia, osteoarthritis with multiple hip surgeries and femoral extension who initially presented to [facility] on 07/29/2020 via EMS after fall  Patient had been  staying with her daughter   She uses a cane to ambulate at baseline  She reportedly did not use her cane and fell down several stairs on her way to the bathroom  On presentation, she complained of L sided CP that is worse with inspiration  She is on ASA intermittently though she states she has not had this for 1 week  Since arrival to hospital, patient has remained hemodynamically stable and afebrile  She has been maintained on 3 L oxygen by nasal cannula and has been saturating in the 90s  Lab significant for WBC 10 79, creatinine 0 90  Patient does not have INR/PTT on file  CT chest abdomen pelvis with contrast demonstrated tiny left-sided pneumothorax and multiple left-sided rib fractures including comminuted displaced fractures of the posterior left 7th through 11th ribs and mildly displaced fractures of the left lateral 5th through 8th ribs  Also noted was a compression deformity of T12 vertebral body  On trauma evaluation, patient's maximum incentive spirometry was 500 cc  Acute pain service consulted for evaluation of rib fracture and evaluation for possible epidural placement  She has not received DVT prophylaxis today  States she has been on Celebrex 100 mg p o  B i d  since 1999  States that she takes tramadol home as needed but only takes this sparingly  She refers that she drinks approximately 1 small glass of wine daily though she denies any withdrawal symptoms such as the shakes or seizures  She does not smoke cigarettes  No illicit drug use  Granddaughter with history of blood clot      Rib Fracture Evaluation:  Injuries:  Multiple rib fractures as outlined above and T12 vertebral body compression deformity  Chest tube:  None  Respiratory Co-morbidities: NA  SpO2:   SPO2 RA Rest      ED from 7/29/2020 in [facility] Emergency Department   SpO2  98 %   SpO2 Activity  At Rest   O2 Device  None (Room air)   O2 Flow Rate          Incentive Spirometer: 600 mL  Platelet Count:   Results from last 7 days   Lab Units 07/29/20  3061 PLATELETS Thousands/uL 283     Coags: not yet performed  Home anticoagulants: On aspirin though she is not taking this for 1 week  DVT prophylaxis: none last dose NA    Current pain location(s):  Left posterior chest wall  Pain Scale: 9/10  Quality:  Throbbing and shooting  Current Analgesic regimen:    Acetaminophen 650 mg p o  Q 6 hours p r n  For mild pain  Oxycodone 2 5/5 mg p o  Q 4 hours p r n  For moderate/severe pain  Hydromorphone 0 2 mg IV q 4 hours p r n  for breakthrough pain  Gabapentin 100 mg p o  HS  Lidocaine patch    Pain History:   Pain Management Provider:      I have reviewed the patient's controlled substance dispensing history in the Prescription Drug Monitoring Program in compliance with the [facility] regulations before prescribing any controlled substances  Inpatient consult to Acute Pain Service  Consult performed by: Naomi De La Vega MD  Consult ordered by: Wilkie Leventhal, MD          Review of Systems   Constitutional: Negative for appetite change, chills, diaphoresis, fatigue, fever and unexpected weight change  HENT: Negative for sore throat  Eyes: Negative for visual disturbance  Respiratory: Negative for cough, chest tightness, shortness of breath and wheezing  Inability to take deep breaths, pain with inspiration, poor cough   Cardiovascular: Negative for chest pain, palpitations and leg swelling  Gastrointestinal: Negative for abdominal distention, abdominal pain, blood in stool, constipation, diarrhea, nausea and vomiting  Genitourinary: Negative for difficulty urinating, flank pain and urgency  Musculoskeletal: Positive for arthralgias, back pain and myalgias  Skin: Negative for pallor and rash  Neurological: Negative for dizziness, weakness, light-headedness and headaches  Historical Information   History reviewed  No pertinent past medical history    Past Surgical History:   Procedure Laterality Date    JOINT REPLACEMENT       Social History Social History     Substance and Sexual Activity   Alcohol Use Never    Frequency: Never     Social History     Substance and Sexual Activity   Drug Use Never     Social History     Tobacco Use   Smoking Status Never Smoker   Smokeless Tobacco Never Used     Family History:   Family History   Family history unknown: Yes       Meds/Allergies   all current active meds have been reviewed, current meds:   Current Facility-Administered Medications   Medication Dose Route Frequency    acetaminophen (TYLENOL) tablet 650 mg  650 mg Oral Q6H PRN    gabapentin (NEURONTIN) capsule 100 mg  100 mg Oral HS    HYDROmorphone (DILAUDID) injection 0 2 mg  0 2 mg Intravenous Q4H PRN    lidocaine (LIDODERM) 5 % patch 1 patch  1 patch Topical Once    oxyCODONE (ROXICODONE) IR tablet 5 mg  5 mg Oral Q4H PRN    oxyCODONE (ROXICODONE) oral solution 2 5 mg  2 5 mg Oral Q4H PRN    and PTA meds:   None       No Known Allergies    Objective   Temp:  [97 7 °F (36 5 °C)] 97 7 °F (36 5 °C)  HR:  [74-89] 74  Resp:  [16-20] 16  BP: (129-161)/(69-89) 143/69  No intake or output data in the 24 hours ending 07/30/20 0857    Physical Exam   Constitutional: She is oriented to person, place, and time  She appears well-developed and well-nourished  No distress  HENT:   Head: Normocephalic and atraumatic  Mouth/Throat: Oropharynx is clear and moist    Eyes: Pupils are equal, round, and reactive to light  Conjunctivae and EOM are normal  No scleral icterus  Neck: Neck supple  No JVD present  No thyromegaly present  Cardiovascular: Normal rate and regular rhythm  No murmur heard  Pulmonary/Chest: Effort normal and breath sounds normal  No respiratory distress  Breath depth limited by pain, oxygen by nasal cannula at 3 L   Abdominal: Soft  Bowel sounds are normal  There is no tenderness  Musculoskeletal: Normal range of motion  She exhibits no edema or deformity     Tenderness to palpation along left lateral and posterior chest walls Lymphadenopathy:     She has no cervical adenopathy  Neurological: She is alert and oriented to person, place, and time  She exhibits normal muscle tone  Skin: Skin is warm and dry  Capillary refill takes less than 2 seconds  No rash noted  She is not diaphoretic  No erythema  Psychiatric: She has a normal mood and affect  Her behavior is normal    Nursing note and vitals reviewed  Lab Results:   I have personally reviewed pertinent labs  , CBC:   Lab Results   Component Value Date    WBC 10 79 (H) 07/29/2020    HGB 13 1 07/29/2020    HCT 39 7 07/29/2020    MCV 94 07/29/2020     07/29/2020    MCH 31 0 07/29/2020    MCHC 33 0 07/29/2020    RDW 13 8 07/29/2020    MPV 10 3 07/29/2020    NRBC 0 07/29/2020   , CMP:   Lab Results   Component Value Date    SODIUM 137 07/30/2020    K 4 4 07/30/2020     07/30/2020    CO2 27 07/30/2020    BUN 13 07/30/2020    CREATININE 0 78 07/30/2020    CALCIUM 9 2 07/30/2020    EGFR 70 07/30/2020   , BMP:  Lab Results   Component Value Date    SODIUM 137 07/30/2020    K 4 4 07/30/2020     07/30/2020    CO2 27 07/30/2020    BUN 13 07/30/2020    CREATININE 0 78 07/30/2020    GLUC 128 07/30/2020    CALCIUM 9 2 07/30/2020    AGAP 5 07/30/2020    EGFR 70 07/30/2020   , PT/PTT:No results found for: PT, PTT    Imaging Studies: I have personally reviewed pertinent reports  EKG, Pathology, and Other Studies: I have personally reviewed pertinent reports  Please note that the APS provides consultative services regarding pain management only  With the exception of ketamine, peripheral nerve catheters, and epidural infusions (and except when indicated), final decisions regarding starting or changing doses of analgesic medications are at the discretion of the consulting service  Off hours consultation and/or medication management is generally not available      Renetta Guerra MD  Acute Pain Service

## 2020-07-30 NOTE — CONSULTS
Consult- Richmond Layne 1935, 80 y o  female MRN: [id number]    Unit/Bed#: ED 26 Encounter: [id number]    Primary Care Provider: Kya Strauss   Date and time admitted to hospital: 7/29/2020  8:57 PM      Inpatient consult to Neurosurgery  Consult performed by: SHIRLEY Teresa  Consult ordered by: Radha Caldwell MD        Closed wedge compression fracture of T12 vertebra [facility])  Assessment & Plan  S/p mechanical fall down 1 step striking her back on a piece of furniture  Patient reports taking baby aspirin daily but has roughly stopped for a week  Imaging:    CT chest abdomen pelvis 07/29/2020:Tiny left-sided hydropneumothorax  Multiple left-sided rib fractures as described above  Compression deformity of T12 vertebral body  Plan:  · Continue neurological exam  · Reviewed imaging with patient and daughter at bedside  · TLSO brace ordered to be worn when OOB or HOB >45 degrees, if patient unable to tolerate can wear brace prn for comfort secondary to multiple left-sided rib fractures  · Ordered upright thoracolumbar spine XR AP/lateral  · No neurosurgical intervention warranted at this time  · Medical management and pain control per primary team  · Mobilize patient as tolerated after fitted for a brace  · PT/OT eval  · DVT PPX: SCDs and cleared from neurosurgical standpoint for pharmacological DVT prophylaxis    Neurosurgery will continue to follow, pending thoracolumbar spine x-ray, call with any questions or concerns  Fall  Assessment & Plan  · Continue fall precautions  · PT/OT eval  · Geriatrics following, input appreciated    * Closed fracture of multiple ribs of left side  Assessment & Plan  Comminuted displaced fractures of the posterior left 7th through 11th ribs are seen  Mildly displaced fractures of the left lateral 5th through 8th ribs are seen  With tiny left-sided hydropneumothorax      · Patient on supplemental oxygen via nasal cannula  · APS following, input appreciated  Patient will have epidural placed for pain control  · Continue incentive spirometer  · Management per primary team    History of Present Illness     HPI: Félix Crystal is a 80 y o  female with PMH significant for hypertension, hyperlipidemia, spinal stenosis, multiple hip surgeries, prolapsed bladder for who presented to [place] [id number] via EMS s/p mechanical fall  Patient reports walking down the steps to use the bathroom when she slipped on the last step striking her back on a piece of furniture, she denies any head strike or LOC  Patient is visiting with her daughter from [place]  Patient has been homebound for the past 2 years secondary to multiple hip surgeries  Patient was unable to get up after the fall and EMS was called  Patient reports taking a baby aspirin daily, but has recently stopped roughly a week ago because she ran out of pills  Patient was found to have a compression fracture of T12 and also multiple left-sided rib fractures and a tiny hydropneumothorax  In the ED patient was complaining of left-sided rib and back pain  Patient reports using a cane at baseline, she was not using at the time of the fall  Prior to this accident patient lives in [place] with her son  Patient currently complaining of left-sided rib pain  She denies any headaches, dizziness, blurry vision, chest pain, shortness of breath, abdominal pain, nausea, vomiting, diarrhea, no problems with bowel or bladder, no new weakness or numbness/tingling  Patient reports she is incontinent of urine secondary to a bladder prolapse, patient reports wearing depends  currently has purwick in place  Review of Systems   Constitutional: Positive for activity change  Negative for chills and fever  HENT: Negative for tinnitus and trouble swallowing  Eyes: Negative for visual disturbance  Respiratory: Negative for shortness of breath      Cardiovascular: Positive for chest pain (Left-sided rib pain)  Gastrointestinal: Negative for abdominal pain, constipation, diarrhea, nausea and vomiting  Genitourinary: Positive for difficulty urinating ( chronically incontinent of urine secondary to prolapse bladder)  Musculoskeletal: Positive for back pain  Negative for gait problem and neck pain  Skin: Negative for wound  Neurological: Negative for dizziness, speech difficulty, weakness, light-headedness, numbness and headaches  Psychiatric/Behavioral: Negative for confusion         Historical Information   Past Medical History:   Diagnosis Date    Hypercholesteremia     Hypertension     Spinal stenosis      Past Surgical History:   Procedure Laterality Date    HIP ARTHROSCOPY Bilateral     HYSTERECTOMY      JOINT REPLACEMENT       Social History     Substance and Sexual Activity   Alcohol Use Never    Frequency: Never     Social History     Substance and Sexual Activity   Drug Use Never     Social History     Tobacco Use   Smoking Status Never Smoker   Smokeless Tobacco Never Used     Family History   Problem Relation Age of Onset    Dementia Mother     Hypothyroidism Mother     Neuromuscular disorder Father        Meds/Allergies   all current active meds have been reviewed, current meds:   Current Facility-Administered Medications   Medication Dose Route Frequency    acetaminophen (TYLENOL) tablet 650 mg  650 mg Oral Q6H PRN    amLODIPine (NORVASC) tablet 5 mg  5 mg Oral Daily    atorvastatin (LIPITOR) tablet 20 mg  20 mg Oral After Dinner    celecoxib (CeleBREX) capsule 200 mg  200 mg Oral BID    gabapentin (NEURONTIN) capsule 100 mg  100 mg Oral HS    lactated ringers bolus 500 mL  500 mL Intravenous Once PRN    midazolam (VERSED) 2 mg/2 mL injection **ADS Override Pull**        ropivacaine 0 1% and fentaNYL 2 mcg/mL PCEA   Epidural Continuous    and PTA meds:   None     No Known Allergies    Objective   I/O     None          Physical Exam   Constitutional: She is oriented to person, place, and time  Vital signs are normal  She appears well-developed and well-nourished  No distress  HENT:   Head: Normocephalic and atraumatic  Eyes: Pupils are equal, round, and reactive to light  Conjunctivae and EOM are normal    Neck: Normal range of motion  No spinous process tenderness and no muscular tenderness present  Cardiovascular: Normal rate  Pulmonary/Chest: Effort normal  No respiratory distress  Patient on supplemental oxygen via nasal cannula   Abdominal: Soft  She exhibits no distension  There is no tenderness  Genitourinary:   Genitourinary Comments: Patient has purwick in place   Musculoskeletal:        Cervical back: She exhibits no tenderness  Thoracic back: She exhibits tenderness  Lumbar back: She exhibits tenderness  Limited ROM to right lower extremity secondary to prior surgery  Per patient report tenderness to thoracolumbar spine, patient's left-sided rib fractures were to severe for patient to roll over to thoroughly assess back pain  Neurological: She is alert and oriented to person, place, and time  She has normal strength  Reflex Scores:       Tricep reflexes are 2+ on the right side and 2+ on the left side  Bicep reflexes are 2+ on the right side and 2+ on the left side  Brachioradialis reflexes are 2+ on the right side and 2+ on the left side  Patellar reflexes are 2+ on the right side and 2+ on the left side  Achilles reflexes are 2+ on the right side and 2+ on the left side  Skin: Skin is warm, dry and intact  Psychiatric: She has a normal mood and affect  Her speech is normal and behavior is normal  Judgment and thought content normal  Cognition and memory are normal      Neurologic Exam     Mental Status   Oriented to person, place, and time  Registration: recalls 3 of 3 objects  Recall at 5 minutes: recalls 3 of 3 objects  Follows 2 step commands     Attention: normal  Concentration: normal    Speech: speech is normal   Level of consciousness: alert  Knowledge: good  Able to perform simple calculations  Able to name object  Able to repeat  Normal comprehension  Cranial Nerves     CN II   Right visual field deficit: none  Left visual field deficit: none     CN III, IV, VI   Pupils are equal, round, and reactive to light  Extraocular motions are normal    CN III: no CN III palsy  CN VI: no CN VI palsy  Nystagmus: none   Diplopia: none  Conjugate gaze: present    CN V   Facial sensation intact  CN VII   Facial expression full, symmetric  CN VIII   CN VIII normal    Hearing: intact    CN IX, X   CN IX normal      CN XI   CN XI normal      CN XII   CN XII normal      Motor Exam   Muscle bulk: normal  Overall muscle tone: normal    Strength   Strength 5/5 throughout  Decreased ROM in RLE secondary to prior surgery     Sensory Exam   Light touch normal    Proprioception normal    JPS and DST intact  Patient reports chronic numbness and tingling in bilateral feet     Gait, Coordination, and Reflexes     Tremor   Resting tremor: absent  Intention tremor: absent  Action tremor: absent    Reflexes   Right brachioradialis: 2+  Left brachioradialis: 2+  Right biceps: 2+  Left biceps: 2+  Right triceps: 2+  Left triceps: 2+  Right patellar: 2+  Left patellar: 2+  Right achilles: 2+  Left achilles: 2+  Right Vivas: absent  Left Vivas: absent  Right ankle clonus: absent  Left ankle clonus: absent      Vitals:Blood pressure 129/55, pulse 94, temperature 97 7 °F (36 5 °C), temperature source Oral, resp  rate 18, height 5' 6" (1 676 m), weight 54 4 kg (120 lb), SpO2 96 %  ,Body mass index is 19 37 kg/m²       Lab Results:   Results from last 7 days   Lab Units 07/30/20  1001 07/29/20  2146   WBC Thousand/uL 7 82 10 79*   HEMOGLOBIN g/dL 10 8* 13 1   HEMATOCRIT % 32 4* 39 7   PLATELETS Thousands/uL 202 283   NEUTROS PCT %  --  74   MONOS PCT %  --  7     Results from last 7 days   Lab Units 07/30/20  0549 07/29/20  2146   POTASSIUM mmol/L 4 4 4 2   CHLORIDE mmol/L 105 104   CO2 mmol/L 27 32   BUN mg/dL 13 13   CREATININE mg/dL 0 78 0 90   CALCIUM mg/dL 9 2 9 0                 No results found for: TROPONINT  ABG:No results found for: PHART, XYX8BUB, PO2ART, YJJ1OHP, T4RZTZWO, BEART, SOURCE    Imaging Studies: I have personally reviewed pertinent reports  and I have personally reviewed pertinent films in PACS    EKG, Pathology, and Other Studies: I have personally reviewed pertinent reports        VTE Prophylaxis: Sequential compression device (Venodyne)  cleared from neurosurgical standpoint to start pharmacological DVT prophylaxis    Code Status: Level 2 - DNAR: but accepts endotracheal intubation  Advance Directive and Living Will:      Power of :    POLST:

## 2020-07-30 NOTE — ANESTHESIA PROCEDURE NOTES
Epidural Block    Patient location during procedure: holding area  Start time: 7/30/2020 1:00 PM  Reason for block: procedure for pain and at surgeon's request  Staffing  Anesthesiologist: Joselin Olsen MD  Performed: anesthesiologist   Preanesthetic Checklist  Completed: patient identified, site marked, surgical consent, pre-op evaluation, timeout performed, IV checked, risks and benefits discussed and monitors and equipment checked  Epidural  Patient position: sitting  Prep: ChloraPrep  Patient monitoring: cardiac monitor and frequent blood pressure checks  Approach: midline  Location: thoracic (1-12)  Injection technique: REJI saline  Needle  Needle type: Tuohy   Needle gauge: 18 G  Catheter type: side hole  Catheter size: 20 G  Catheter at skin depth: 10 cm  Test dose: negativenegative aspiration for CSF, negative aspiration for heme and no paresthesia on injection  patient tolerated the procedure well with no immediate complications  Additional Notes  Single skin puncture and needle pass  REJI saline @ 4 cm  Catheter threaded to 18cm w/o paresthesias  Final position 10 at skin  Aspirated thru catheter neg for heme/csf; td negative        Patient monitored for 30minutes post epidural placement with AVSS

## 2020-07-30 NOTE — ED CARE HANDOFF
Emergency Department Sign Out Note        Sign out and transfer of care from Dr Enedina Valentin  See Separate Emergency Department note  The patient, Alejandra Carbone, was evaluated by the previous provider for fall and left-sided rib pain  Workup Completed:  Labs    ED Course / Workup Pending (followup):  CT chest abdomen and pelvis        Identification of Seniors at Risk      Most Recent Value   (ISAR) Identification of Seniors at Risk   Before the illness or injury that brought you to the Emergency, did you need someone to help you on a regular basis? 0 Filed at: 07/29/2020 2103   In the last 24 hours, have you needed more help than usual?  0 Filed at: 07/29/2020 2103   Have you been hospitalized for one or more nights during the past 6 months? 0 Filed at: 07/29/2020 2103   In general, do you see well?  0 Filed at: 07/29/2020 2103   In general, do you have serious problems with your memory? 0 Filed at: 07/29/2020 2103   Do you take more than three different medications every day? 1 Filed at: 07/29/2020 2103   ISAR Score  1 Filed at: 07/29/2020 2103                        ED Course as of Jul 30 0439   Wed Jul 29, 2020   2259 Left rib pain, dispo pending CT scan      2309 SO:  Mechanical fall rib pain on the left side some abdominal tenderness dispo pending CT scan      u Jul 30, 2020   0009 Trauma consulted patient updated on CT scan results          Procedures  MDM  Number of Diagnoses or Management Options  Closed fracture of multiple ribs of left side, initial encounter:   Compression fracture of T12 vertebra, initial encounter ([facility] ):   Fall, initial encounter:   Hydropneumothorax:   Diagnosis management comments: CT scan demonstrated multiple left-sided rib fractures with small hydropneumothorax as well as T12 compression  Trauma was consulted        Disposition  Final diagnoses:   Fall, initial encounter   Compression fracture of T12 vertebra, initial encounter ([facility] )   Closed fracture of multiple ribs of left side, initial encounter   Hydropneumothorax     Time reflects when diagnosis was documented in both MDM as applicable and the Disposition within this note     Time User Action Codes Description Comment    7/29/2020 11:02 PM Robert Ivey Add [W19  BQFK] Fall, initial encounter     7/30/2020 12:08 AM Check, Miguelito Sox Add [M95 397M] Compression fracture of T12 vertebra, initial encounter ([facility] [address] )     7/30/2020 12:09 AM Check, Miguelito Sox Add [S22 42XA] Closed fracture of multiple ribs of left side, initial encounter     7/30/2020 12:09 AM Check, Miguelito Sox Add [J94 8] Hydropneumothorax     7/30/2020 12:09 AM Check, Akash Manner [Y13  [place] Fall, initial encounter     7/30/2020 12:09 AM Check, Miguelito Sox Modify [S22 42XA] Closed fracture of multiple ribs of left side, initial encounter       ED Disposition     ED Disposition Condition Date/Time Comment    Admit Stable Thu Jul 30, 2020 12:26 AM Case was discussed with Trauma and the patient's admission status was agreed to be Admission Status: inpatient status to the service of Dr Luke Del Rosario  Follow-up Information    None       Patient's Medications    No medications on file     No discharge procedures on file         ED Provider  Electronically Signed by     Roscoe Meade MD  07/30/20 8406

## 2020-07-30 NOTE — ASSESSMENT & PLAN NOTE
- Acute pain due to multiple traumatic injuries, present on admission  Patient with history of chronic pain and takes Celebrex and Neurontin at baseline   - Continue multimodal analgesic regimen  Appreciate APS evaluation and recommendations with plan for epidural catheter insertion today  - Continue to monitor her pain and make adjustments as indicated

## 2020-07-30 NOTE — ED PROVIDER NOTES
History  Chief Complaint   Patient presents with    Fall     Patient slipped / tripped going down the last step and crashed into a piece of furniture hitting her left side / ribs; complaining of left sided rib and back pain; patient didn't strike head, denies LOC or thinners      A 3year-old female presents after mechanical fall with left rib pain  Patient is here visiting her family and slipped and fell about 20 minutes prior to arrival   Patient got up to go the bathroom and did not use her cane that she usually walks with  She slipped on the last stair and fell down and hit her back on the stairs  She did not have any chest pain, dizziness, or palpitations prior to falling  Patient did not hit her head or lose consciousness  Patient is only complaining of pain on her left ribcage  She says the pain is worse with breathing  She feels like she can hear her ribs crunching when she takes a breath  She is not having trouble breathing  Patient took 2 tramadol before coming in but it did not help the pain  Patient arrives via EMS with a cervical collar and backboard in place  She is not having any neck pain  None       Past Medical History:   Diagnosis Date    Hypercholesteremia     Hypertension     Spinal stenosis        Past Surgical History:   Procedure Laterality Date    HIP ARTHROSCOPY Bilateral     HYSTERECTOMY      JOINT REPLACEMENT         Family History   Problem Relation Age of Onset    Dementia Mother     Hypothyroidism Mother     Neuromuscular disorder Father      I have reviewed and agree with the history as documented      E-Cigarette/Vaping    E-Cigarette Use Never User      E-Cigarette/Vaping Substances     Social History     Tobacco Use    Smoking status: Never Smoker    Smokeless tobacco: Never Used   Substance Use Topics    Alcohol use: Not Currently     Frequency: Never    Drug use: Never        Review of Systems   Constitutional: Negative for chills, fatigue and fever    Respiratory: Negative for cough, chest tightness, shortness of breath and wheezing  Cardiovascular: Negative for chest pain and palpitations  Gastrointestinal: Negative for abdominal pain, diarrhea, nausea and vomiting  Genitourinary: Negative for difficulty urinating, dysuria, hematuria and pelvic pain  Musculoskeletal: Positive for back pain  Negative for arthralgias, neck pain and neck stiffness  Skin: Negative for pallor and wound  Neurological: Negative for dizziness, syncope, weakness and headaches  Physical Exam  ED Triage Vitals [07/29/20 2101]   Temperature Pulse Respirations Blood Pressure SpO2   97 7 °F (36 5 °C) 89 18 129/89 98 %      Temp Source Heart Rate Source Patient Position - Orthostatic VS BP Location FiO2 (%)   Oral Monitor Lying Left arm --      Pain Score       Worst Possible Pain             Orthostatic Vital Signs  Vitals:    08/02/20 2306 08/03/20 0307 08/03/20 0312 08/03/20 0700   BP: 128/75 170/91 166/82 144/76   Pulse: 97 91  87   Patient Position - Orthostatic VS:    Lying       Physical Exam   Constitutional: She appears well-developed and well-nourished  HENT:   Head: Normocephalic and atraumatic  Eyes: Conjunctivae are normal    Neck: Normal range of motion  Neck supple  Cardiovascular: Normal rate and regular rhythm  No murmur heard  Pulmonary/Chest: Effort normal and breath sounds normal  No accessory muscle usage or stridor  No tachypnea and no bradypnea  No respiratory distress  She has no wheezes  She has no rales  She exhibits tenderness (tenderness to the left ribcage )  She exhibits no laceration, no crepitus and no edema  Abdominal: She exhibits no distension  There is tenderness in the epigastric area  There is guarding  There is no rigidity and no rebound         ED Medications  Medications   acetaminophen (TYLENOL) tablet 650 mg (650 mg Oral Given 8/3/20 0737)   celecoxib (CeleBREX) capsule 200 mg (200 mg Oral Given 8/3/20 0738) amLODIPine (NORVASC) tablet 5 mg (5 mg Oral Given 8/3/20 0738)   atorvastatin (LIPITOR) tablet 20 mg (20 mg Oral Given 8/2/20 1800)   midazolam (VERSED) 2 mg/2 mL injection **ADS Override Pull** (  Canceled Entry 7/30/20 1652)   HYDROmorphone (DILAUDID) injection 0 2 mg (0 2 mg Intravenous Given 8/3/20 0423)   lactated ringers bolus 500 mL (has no administration in time range)   diphenhydrAMINE (BENADRYL) injection 12 5 mg (12 5 mg Intravenous Given 8/3/20 0029)   heparin (porcine) subcutaneous injection 5,000 Units (5,000 Units Subcutaneous Given 8/2/20 2224)   oxyCODONE (ROXICODONE) IR tablet 2 5 mg (2 5 mg Oral Given 7/31/20 0643)   oxyCODONE (ROXICODONE) IR tablet 5 mg (5 mg Oral Given 8/3/20 0737)   senna-docusate sodium (SENOKOT S) 8 6-50 mg per tablet 1 tablet (1 tablet Oral Given 8/3/20 0738)   polyethylene glycol (MIRALAX) packet 17 g (has no administration in time range)   loratadine (CLARITIN) tablet 10 mg (10 mg Oral Given 8/3/20 0738)   ropivacaine 0 1 % PCEA ( Epidural New Bag 8/3/20 0422)   melatonin tablet 3 mg (3 mg Oral Given 8/2/20 2224)   morphine (PF) 4 mg/mL injection 4 mg (4 mg Intravenous Given 7/29/20 2147)   HYDROmorphone (DILAUDID) injection 0 5 mg (0 5 mg Intravenous Given 7/29/20 2309)   lidocaine (LIDODERM) 5 % patch 1 patch (1 patch Topical Patch Removed 7/30/20 2304)   iohexol (OMNIPAQUE) 350 MG/ML injection (MULTI-DOSE) 100 mL (100 mL Intravenous Given 7/29/20 2330)   HYDROmorphone (DILAUDID) injection 1 mg (1 mg Intravenous Given 7/30/20 0203)   tetanus-diphtheria-acellular pertussis (BOOSTRIX) IM injection 0 5 mL (0 5 mL Intramuscular Given 7/30/20 0444)   fentanyl citrate (PF) 100 MCG/2ML **ADS Override Pull** (  Override pull for Anesthesia 7/30/20 1240)   methocarbamol (ROBAXIN) tablet 500 mg (500 mg Oral Given 7/31/20 0007)   fentanyl citrate (PF) 100 MCG/2ML **ADS Override Pull** (  Override pull for Anesthesia 7/31/20 1509)       Diagnostic Studies  Results Reviewed Procedure Component Value Units Date/Time    CBC (With Platelets) [[id number]]  (Abnormal) Collected:  07/30/20 1001    Lab Status:  Final result Specimen:  Blood from Arm, Right Updated:  07/30/20 1013     WBC 7 82 Thousand/uL      RBC 3 43 Million/uL      Hemoglobin 10 8 g/dL      Hematocrit 32 4 %      MCV 95 fL      MCH 31 5 pg      MCHC 33 3 g/dL      RDW 13 8 %      Platelets 382 Thousands/uL      MPV 10 1 fL     Basic metabolic panel [[id number]] Collected:  07/30/20 0549    Lab Status:  Final result Specimen:  Blood from Arm, Right Updated:  07/30/20 3795     Sodium 137 mmol/L      Potassium 4 4 mmol/L      Chloride 105 mmol/L      CO2 27 mmol/L      ANION GAP 5 mmol/L      BUN 13 mg/dL      Creatinine 0 78 mg/dL      Glucose 128 mg/dL      Calcium 9 2 mg/dL      eGFR 70 ml/min/1 73sq m     Narrative:       [place] guidelines for Chronic Kidney Disease (CKD):     Stage 1 with normal or high GFR (GFR > 90 mL/min/1 73 square meters)    Stage 2 Mild CKD (GFR = 60-89 mL/min/1 73 square meters)    Stage 3A Moderate CKD (GFR = 45-59 mL/min/1 73 square meters)    Stage 3B Moderate CKD (GFR = 30-44 mL/min/1 73 square meters)    Stage 4 Severe CKD (GFR = 15-29 mL/min/1 73 square meters)    Stage 5 End Stage CKD (GFR <15 mL/min/1 73 square meters)  Note: GFR calculation is accurate only with a steady state creatinine    Novel Coronavirus [facility] [[id number]]  (Normal) Collected:  07/30/20 0232    Lab Status:  Final result Specimen:  Nares from Nasopharyngeal Swab Updated:  07/30/20 0401     SARS-CoV-2 Negative    Narrative: The specimen collection materials, transport medium, and/or testing methodology utilized in the production of these test results have been proven to be reliable in a limited validation with an abbreviated program under the Emergency Utilization Authorization provided by the FDA    Testing reported as "Presumptive positive" will be confirmed with secondary testing with a reference laboratory to ensure result accuracy  Clinical caution and judgement should be used with the interpretation of these results with consideration of the clinical impression and other laboratory testing  Testing reported as "Positive" or "Negative" has been proven to be accurate according to standard laboratory validation requirements  All testing is performed with control materials showing appropriate reactivity at standard intervals        Basic metabolic panel [[id number]]  (Abnormal) Collected:  07/29/20 2146    Lab Status:  Final result Specimen:  Blood from Arm, Right Updated:  07/29/20 2219     Sodium 139 mmol/L      Potassium 4 2 mmol/L      Chloride 104 mmol/L      CO2 32 mmol/L      ANION GAP 3 mmol/L      BUN 13 mg/dL      Creatinine 0 90 mg/dL      Glucose 112 mg/dL      Calcium 9 0 mg/dL      eGFR 59 ml/min/1 73sq m     Narrative:       [place] guidelines for Chronic Kidney Disease (CKD):     Stage 1 with normal or high GFR (GFR > 90 mL/min/1 73 square meters)    Stage 2 Mild CKD (GFR = 60-89 mL/min/1 73 square meters)    Stage 3A Moderate CKD (GFR = 45-59 mL/min/1 73 square meters)    Stage 3B Moderate CKD (GFR = 30-44 mL/min/1 73 square meters)    Stage 4 Severe CKD (GFR = 15-29 mL/min/1 73 square meters)    Stage 5 End Stage CKD (GFR <15 mL/min/1 73 square meters)  Note: GFR calculation is accurate only with a steady state creatinine    CBC and differential [[id number]]  (Abnormal) Collected:  07/29/20 2146    Lab Status:  Final result Specimen:  Blood from Arm, Right Updated:  07/29/20 2218     WBC 10 79 Thousand/uL      RBC 4 22 Million/uL      Hemoglobin 13 1 g/dL      Hematocrit 39 7 %      MCV 94 fL      MCH 31 0 pg      MCHC 33 0 g/dL      RDW 13 8 %      MPV 10 3 fL      Platelets 109 Thousands/uL      nRBC 0 /100 WBCs      Neutrophils Relative 74 %      Immat GRANS % 1 %      Lymphocytes Relative 13 %      Monocytes Relative 7 % Eosinophils Relative 4 %      Basophils Relative 1 %      Neutrophils Absolute 8 07 Thousands/µL      Immature Grans Absolute 0 07 Thousand/uL      Lymphocytes Absolute 1 42 Thousands/µL      Monocytes Absolute 0 76 Thousand/µL      Eosinophils Absolute 0 39 Thousand/µL      Basophils Absolute 0 08 Thousands/µL                  XR chest pa & lateral (24 hours after admission)   Final Result by Cecilia Arredondo MD (07/31 1616)      Left basal consolidation could relate to effusion, atelectasis and/or pneumonia  Displaced left rib fractures are again identified as seen on recent CT  No obvious pneumothorax, tiny pneumothorax would be better evaluated with CT  No mediastinal shift  Compression fracture of T12 vertebral body as seen on CT  Workstation performed: [id number]         XR spine thoracolumbar 2 vw   Final Result by Cecilia Arredondo MD (07/31 7439)      Superior endplate compression fracture of T12 is again identified, not significant changed with approximately 30-40% height loss      Questionable fracture line through the L4 vertebral body was not seen on prior CT, follow-up lumbar spine CT recommended for further evaluation  Diffuse degenerative changes throughout the thoracolumbar spine  Workstation performed: [id number]         CT chest abdomen pelvis w contrast   Final Result by Joyice Score, DO (07/29 5411)      Tiny left-sided hydropneumothorax  Multiple left-sided rib fractures as described above  Compression deformity of T12 vertebral body             I personally discussed this study with Dr Lamar Parks on 7/29/2020 at 11:50 PM                      Workstation performed: [id number]         XR spine thoracolumbar 2 vw    (Results Pending)         Procedures  Procedures      ED Course       US AUDIT      Most Recent Value   Initial Alcohol Screen: US AUDIT-C    1  How often do you have a drink containing alcohol?  0 Filed at: 07/29/2020 2102   2   How many drinks containing alcohol do you have on a typical day you are drinking? 0 Filed at: 07/29/2020 2102   3a  Male UNDER 65: How often do you have five or more drinks on one occasion? 0 Filed at: 07/29/2020 2102   3b  FEMALE Any Age, or MALE 65+: How often do you have 4 or more drinks on one occassion? 0 Filed at: 07/29/2020 2102   Audit-C Score  0 Filed at: 07/29/2020 2102              Identification of Seniors at Risk      Most Recent Value   (ISAR) Identification of Seniors at Risk   Before the illness or injury that brought you to the Emergency, did you need someone to help you on a regular basis? 0 Filed at: 07/29/2020 2103   In the last 24 hours, have you needed more help than usual?  0 Filed at: 07/29/2020 2103   Have you been hospitalized for one or more nights during the past 6 months? 0 Filed at: 07/29/2020 2103   In general, do you see well?  0 Filed at: 07/29/2020 2103   In general, do you have serious problems with your memory? 0 Filed at: 07/29/2020 2103   Do you take more than three different medications every day? 1 Filed at: 07/29/2020 2103   ISAR Score  1 Filed at: 07/29/2020 2103          MANDY/DAST-10      Most Recent Value   How many times in the past year have you    Used an illegal drug or used a prescription medication for non-medical reasons? Never Filed at: 07/29/2020 2102                              MDM  Number of Diagnoses or Management Options  Fall, initial encounter:   Diagnosis management comments: 60-year-old female presents after mechanical fall  Patient is satting well not having any trouble breathing  She has bilateral breath sounds  She has some tenderness and guarding of her abdomen  Will get a CT chest, abdomen, and pelvis  Will also check a BMP and CBC  Patient will be given morphine for pain         Amount and/or Complexity of Data Reviewed  Clinical lab tests: ordered and reviewed  Tests in the radiology section of CPT®: ordered and reviewed  Review and summarize past medical records: yes  Discuss the patient with other providers: yes    Risk of Complications, Morbidity, and/or Mortality  Presenting problems: moderate  Diagnostic procedures: moderate  Management options: moderate    Patient Progress  Patient progress: stable    Patient was seen here for fall  Patient signed out to Dr Love Quiros before CT chest abdomen pelvis was obtained  Disposition  Final diagnoses:   Fall, initial encounter   Compression fracture of T12 vertebra, initial encounter ([facility] )   Closed fracture of multiple ribs of left side, initial encounter   Hydropneumothorax     Time reflects when diagnosis was documented in both MDM as applicable and the Disposition within this note     Time User Action Codes Description Comment    7/29/2020 11:02 PM Alver Pin Add [W19  LHFL] Fall, initial encounter     7/30/2020 12:08 AM Check, Ayaz Diamond Add [K49 177B] Compression fracture of T12 vertebra, initial encounter ([facility] )     7/30/2020 12:09 AM Check, Ayaz Diamond Add [S22 42XA] Closed fracture of multiple ribs of left side, initial encounter     7/30/2020 12:09 AM Check, Ayaz Diamond Add [J94 8] Hydropneumothorax     7/30/2020 12:09 AM Check, Reba Mims [C73  [place] Fall, initial encounter     7/30/2020 12:09 AM Check, Ayaz Diamond Modify [S22 42XA] Closed fracture of multiple ribs of left side, initial encounter       ED Disposition     ED Disposition Condition Date/Time Comment    Admit Stable Thu Jul 30, 2020 12:26 AM Case was discussed with Trauma and the patient's admission status was agreed to be Admission Status: inpatient status to the service of Dr Kathy De La Rosa  Follow-up Information     Follow up With Specialties Details Why Contact Info Additional Information    Kya Strauss  Schedule an appointment as soon as possible for a visit in 2 week(s) Please call for appointment within 2 weeks to review your hospital encounter and discuss the incidental findings   Annie Akhtar  [address] [facility] Neurosurgery Follow up in 2 week(s) Please follow-up in 2 weeks with upright thoracolumbar x-ray, please have done prior to appointment  [address], [address] [place], [place], [address] [phone]          There are no discharge medications for this patient  Outpatient Discharge Orders   XR spine thoracolumbar 2 vw   Standing Status: Future Standing Exp  Date: 07/31/24       PDMP Review       Value Time User    PDMP Reviewed  Yes 7/30/2020  8:05 AM Tucathi Moon, [facility]           ED Provider  Attending physically available and evaluated Marshel Klinefelter  I managed the patient along with the ED Attending      Electronically Signed by         Moises Henriquez DO  07/29/20 94 [facility] MD Sal  08/03/20 9774

## 2020-07-30 NOTE — QUICK NOTE
I spoke with the patient's daughter, Nicholas Lopez, at the bedside to provider an update and review the incidental findings noted on her trauma workup  I spent greater than 10 minutes reviewing the chart and updated the patient's daughter      Timothy Peña PA-C  7/30/2020 12:15 PM

## 2020-07-30 NOTE — ASSESSMENT & PLAN NOTE
- Multiple left-sided rib fractures including posteriorly 7-11 and laterall 5-8 with associated small left-sided hemopneumothorax  - Continue rib fracture protocol   - Continue multimodal analgesic regimen  Appreciate APS evaluation and recommendations with plan for epidural catheter insertion today  - Plan to initiate chemical DVT prophylaxis following epidural catheter insertion   - Continue to encourage incentive spirometer use and adequate pulmonary hygiene   - PT and OT evaluation and treatment as indicated  - Await repeat chest x-ray today for further evaluation of small left-sided hemopneumothorax

## 2020-07-30 NOTE — ASSESSMENT & PLAN NOTE
- T12 compression fracture  - Appreciate Neurosurgery evaluation and recommendations  Non operative management recommended  - Maintain TLSO brace whenever upright and/or out of bed  - Await upright thoracolumbar spine x-rays per Neurosurgery recommendations  - Continue multimodal analgesic regimen   - PT and OT evaluation and treatment as indicated    - Monitor neurologic exam

## 2020-07-30 NOTE — ASSESSMENT & PLAN NOTE
- Status post fall with the below noted injuries  - Fall precautions   - PT and OT evaluation and treatment as indicated  - Arya Frank Medicine evaluation and treatment as indicated  - Case management consult for disposition planning

## 2020-07-30 NOTE — ADDENDUM NOTE
Addendum  created 07/30/20 1345 by Jeffrey Rasmussen MD    Intraprocedure Event edited, Intraprocedure Flowsheets edited, Intraprocedure Staff edited

## 2020-07-30 NOTE — ASSESSMENT & PLAN NOTE
- Chronic history hypertension   - Continue home medication regimen including Norvasc and metoprolol   - Outpatient follow-up with primary care provider

## 2020-07-30 NOTE — ASSESSMENT & PLAN NOTE
Comminuted displaced fractures of the posterior left 7th through 11th ribs are seen  Mildly displaced fractures of the left lateral 5th through 8th ribs are seen  With tiny left-sided hydropneumothorax  · Patient on supplemental oxygen via nasal cannula  · APS following, input appreciated    Patient will have epidural placed for pain control  · Continue incentive spirometer  · Management per primary team

## 2020-07-30 NOTE — PROGRESS NOTES
Progress Note - Alejandra Carbone 1935, 80 y o  female MRN: [id number]    Unit/Bed#: ED 26 Encounter: [id number]    Primary Care Provider: Nikki Bhatti   Date and time admitted to hospital: 7/29/2020  8:57 PM        Fall  Assessment & Plan  - Status post fall with the below noted injuries  - Fall precautions   - PT and OT evaluation and treatment as indicated  - [facility] Medicine evaluation and treatment as indicated  - Case management consult for disposition planning  * Closed fracture of multiple ribs of left side  Assessment & Plan  - Multiple left-sided rib fractures including posteriorly 7-11 and laterall 5-8 with associated small left-sided hemopneumothorax  - Continue rib fracture protocol   - Continue multimodal analgesic regimen  Appreciate APS evaluation and recommendations with plan for epidural catheter insertion today  - Plan to initiate chemical DVT prophylaxis following epidural catheter insertion   - Continue to encourage incentive spirometer use and adequate pulmonary hygiene   - PT and OT evaluation and treatment as indicated  - Await repeat chest x-ray today for further evaluation of small left-sided hemopneumothorax  Hemopneumothorax on left  Assessment & Plan  - Small left-sided hemopneumothorax, present on admission   - No intervention necessary at this time, and no intervention anticipated  - Await repeat chest x-ray today for further evaluation of small left-sided hemopneumothorax  - Management of rib fractures as noted  Closed wedge compression fracture of T12 vertebra (HCC)  Assessment & Plan  - T12 compression fracture  - Appreciate Neurosurgery evaluation and recommendations  Non operative management recommended  - Maintain TLSO brace whenever upright and/or out of bed  - Await upright thoracolumbar spine x-rays per Neurosurgery recommendations  - Continue multimodal analgesic regimen   - PT and OT evaluation and treatment as indicated    - Monitor neurologic exam     Acute pain due to trauma  Assessment & Plan  - Acute pain due to multiple traumatic injuries, present on admission  Patient with history of chronic pain and takes Celebrex and Neurontin at baseline   - Continue multimodal analgesic regimen  Appreciate APS evaluation and recommendations with plan for epidural catheter insertion today  - Continue to monitor her pain and make adjustments as indicated  Hypertension  Assessment & Plan  - Chronic history hypertension   - Continue home medication regimen including Norvasc and metoprolol   - Outpatient follow-up with primary care provider  Prophylaxis: Sequential compression device (Venodyne)     Disposition:  Not yet appropriate for discharge secondary to multiple traumatic injuries, further workup pending, PT and OT evaluation and recommendations, and adequate analgesia on oral medications  Case Management consulted for disposition planning  Code status:  Level 2 - DNAR: but accepts endotracheal intubation    Consultants:     1  Geriatric Medicine  2  APS  Is the patient 72 years or older?: YES:    1  Before the illness or injury that brought you to the Emergency, did you need someone to help you on a regular basis? 0=No   2  Since the illness or injury that brought you to the Emergency, have you needed more help than usual to take care of yourself? 1=Yes   3  Have you been hospitalized for one or more nights during the past 6 months (excluding a stay in the Emergency Department)? 0=No   4  In general, do you see well? 0=Yes   5  In general, do you have serious problems with your memory? 0=No   6  Do you take more than three different medications everyday?  1=Yes   TOTAL   2     Did you order a geriatric consult if the score was 2 or greater?: yes    Identification of Seniors at Risk      Most Recent Value   (ISAR) Identification of Seniors at Risk   Before the illness or injury that brought you to the Emergency, did you need someone to help you on a regular basis? 0 Filed at: 07/29/2020 2103   In the last 24 hours, have you needed more help than usual?  0 Filed at: 07/29/2020 2103   Have you been hospitalized for one or more nights during the past 6 months? 0 Filed at: 07/29/2020 2103   In general, do you see well?  0 Filed at: 07/29/2020 2103   In general, do you have serious problems with your memory? 0 Filed at: 07/29/2020 2103   Do you take more than three different medications every day? 1 Filed at: 07/29/2020 2103   ISAR Score  1 Filed at: 07/29/2020 2103            SUBJECTIVE:     Transfer from: N/A  Outside Films Received: not applicable  Tertiary Exam Due on:  07/30/2020    Mechanism of Injury:Fall    Details related to Injury: +LOC:  no    Chief Complaint:  I feel okay right now      HPI/Last 24 hour events:  Patient is feeling okay while at rest in bed  She has had pain in her left chest as well as her mid back  Her left chest wall pain is site of her most discomfort  The pain is worsened by movement and/or deep breaths  She denies any neck pain, abdominal pain, pelvic pain, and or extremity pain  Active medications:           Current Facility-Administered Medications:     acetaminophen (TYLENOL) tablet 650 mg, 650 mg, Oral, Q6H PRN    amLODIPine (NORVASC) tablet 5 mg, 5 mg, Oral, Daily    atorvastatin (LIPITOR) tablet 20 mg, 20 mg, Oral, After Dinner    celecoxib (CeleBREX) capsule 200 mg, 200 mg, Oral, BID    gabapentin (NEURONTIN) capsule 100 mg, 100 mg, Oral, HS    HYDROmorphone (DILAUDID) injection 0 2 mg, 0 2 mg, Intravenous, Q4H PRN, 0 2 mg at 07/30/20 0952    oxyCODONE (ROXICODONE) IR tablet 5 mg, 5 mg, Oral, Q4H PRN    oxyCODONE (ROXICODONE) oral solution 2 5 mg, 2 5 mg, Oral, Q4H PRN  No current outpatient medications on file        OBJECTIVE:     Vitals:   Vitals:    07/30/20 1015   BP: 139/74   Pulse: 76   Resp: 16   Temp:    SpO2: 97%       Physical Exam:   GENERAL APPEARANCE: Patient in no acute distress  HEENT: NCAT; PERRL, EOMs intact; Mucous membranes moist  NECK / BACK:  Tenderness over the thoracolumbar spine without step-off or deformity  No cervical spine tenderness  No paraspinal muscular tenderness in the neck or back  CV: Regular rate and rhythm; no murmur/gallops/rubs appreciated  CHEST / LUNGS: Clear to auscultation; no wheezes/rales/rhonci  Moderate to severe left lateral and posterior chest wall tenderness without crepitus or deformity  ABD: NABS; soft; non-distended; non-tender  EXT: +2 pulses bilaterally upper & lower extremities; no clubbing/cyanosis/edema  Normal range of motion in the bilateral upper extremities and left lower extremity without pain or tenderness  Normal range of motion without pain or tenderness in the right hip and right ankle/foot, but limited range of motion at the right knee due to prior surgical intervention  No acute change in the patient's range of motion or associated pain or tenderness at right knee  NEURO: GCS 15; no focal neurologic deficits; neurovascularly intact  SKIN: Warm, dry and well perfused; no rash; no jaundice  I/O:   I/O     None          Invasive Devices: Invasive Devices     Peripheral Intravenous Line            Peripheral IV 07/29/20 Right Antecubital less than 1 day                  Imaging:   Ct Chest Abdomen Pelvis W Contrast    Result Date: 7/29/2020  Impression: Tiny left-sided hydropneumothorax  Multiple left-sided rib fractures as described above   Compression deformity of T12 vertebral body  I personally discussed this study with Dr Ivan Galeas on 7/29/2020 at 11:50 PM  Workstation performed: [id number]       Labs:   CBC:   Lab Results   Component Value Date    WBC 7 82 07/30/2020    HGB 10 8 (L) 07/30/2020    HCT 32 4 (L) 07/30/2020    MCV 95 07/30/2020     07/30/2020    MCH 31 5 07/30/2020    MCHC 33 3 07/30/2020    RDW 13 8 07/30/2020    MPV 10 1 07/30/2020    NRBC 0 07/29/2020     CMP:   Lab Results Component Value Date     07/30/2020    CO2 27 07/30/2020    BUN 13 07/30/2020    CREATININE 0 78 07/30/2020    CALCIUM 9 2 07/30/2020    EGFR 70 07/30/2020     Coagulation: No results found for: PT, INR, APTT        Brian Omalley PA-C  7/30/2020 11:09 AM

## 2020-07-30 NOTE — ED NOTES
PT placed in hospital bed for comfort at this time     [facility]  07/30/20 [address]

## 2020-07-30 NOTE — ED ATTENDING ATTESTATION
7/29/2020  ICameron MD, saw and evaluated the patient  I have discussed the patient with the resident/non-physician practitioner and agree with the resident's/non-physician practitioner's findings, Plan of Care, and MDM as documented in the resident's/non-physician practitioner's note, except where noted  All available labs and Radiology studies were reviewed  I was present for key portions of any procedure(s) performed by the resident/non-physician practitioner and I was immediately available to provide assistance  At this point I agree with the current assessment done in the Emergency Department  I have conducted an independent evaluation of this patient a history and physical is as follows:    ED Course     Patient presents after a mechanical fall where she was not using her walker and fell down several steps  Patient struck the left side of her chest into a piece of furniture and then fell backwards striking the stairs  Patient did not strike her head or lose consciousness  Currently, patient reports left-sided rib pain  No additional complaints  Exam: AAOx3, NAD, RRR, splinting, S/NT/ND, no motor/sensory deficits, left chest wall tenderness to palpation  A/P:  Mechanical fall, left chest injury  Given location of injury, will CT chest/abdomen/pelvis to evaluate for occult pneumothorax or intra-abdominal injury in addition to likely rib fracture  Pain control       Critical Care Time  Procedures

## 2020-07-30 NOTE — INCIDENTAL FINDINGS
The following findings require follow up:  Radiographic finding   Finding: One or more sharply circumscribed subcentimeter renal hypodensities are present, too small to accurately characterize, and statistically most likely benign findings  Follow up required: According to recent literature (Radiology 2019) no further workup of these findings is recommended  Recommend outpatient follow-up with primary care provider     Follow up should be done within 2 month(s)    Please notify the following clinician to assist with the follow up:   Dr Jordan Alter

## 2020-07-31 ENCOUNTER — APPOINTMENT (INPATIENT)
Dept: RADIOLOGY | Facility: HOSPITAL | Age: 85
DRG: 551 | End: 2020-07-31
Payer: MEDICARE

## 2020-07-31 ENCOUNTER — ANESTHESIA EVENT (INPATIENT)
Dept: ANESTHESIOLOGY | Facility: HOSPITAL | Age: 85
DRG: 551 | End: 2020-07-31
Payer: MEDICARE

## 2020-07-31 ENCOUNTER — APPOINTMENT (INPATIENT)
Dept: SURGERY | Facility: HOSPITAL | Age: 85
DRG: 551 | End: 2020-07-31
Payer: MEDICARE

## 2020-07-31 ENCOUNTER — ANESTHESIA (INPATIENT)
Dept: ANESTHESIOLOGY | Facility: HOSPITAL | Age: 85
DRG: 551 | End: 2020-07-31
Payer: MEDICARE

## 2020-07-31 PROCEDURE — 99233 SBSQ HOSP IP/OBS HIGH 50: CPT | Performed by: NURSE PRACTITIONER

## 2020-07-31 PROCEDURE — 97163 PT EVAL HIGH COMPLEX 45 MIN: CPT

## 2020-07-31 PROCEDURE — 71046 X-RAY EXAM CHEST 2 VIEWS: CPT

## 2020-07-31 PROCEDURE — 97167 OT EVAL HIGH COMPLEX 60 MIN: CPT

## 2020-07-31 PROCEDURE — 72080 X-RAY EXAM THORACOLMB 2/> VW: CPT

## 2020-07-31 PROCEDURE — 94760 N-INVAS EAR/PLS OXIMETRY 1: CPT

## 2020-07-31 PROCEDURE — 99232 SBSQ HOSP IP/OBS MODERATE 35: CPT | Performed by: SURGERY

## 2020-07-31 RX ORDER — LORATADINE 10 MG/1
10 TABLET ORAL DAILY
Status: DISCONTINUED | OUTPATIENT
Start: 2020-07-31 | End: 2020-08-05 | Stop reason: HOSPADM

## 2020-07-31 RX ORDER — GABAPENTIN 100 MG/1
100 CAPSULE ORAL 2 TIMES DAILY
Status: DISCONTINUED | OUTPATIENT
Start: 2020-07-31 | End: 2020-08-02

## 2020-07-31 RX ORDER — AMOXICILLIN 250 MG
1 CAPSULE ORAL 2 TIMES DAILY
Status: DISCONTINUED | OUTPATIENT
Start: 2020-07-31 | End: 2020-08-05 | Stop reason: HOSPADM

## 2020-07-31 RX ORDER — FENTANYL CITRATE 50 UG/ML
INJECTION, SOLUTION INTRAMUSCULAR; INTRAVENOUS
Status: COMPLETED
Start: 2020-07-31 | End: 2020-07-31

## 2020-07-31 RX ORDER — POLYETHYLENE GLYCOL 3350 17 G/17G
17 POWDER, FOR SOLUTION ORAL DAILY PRN
Status: DISCONTINUED | OUTPATIENT
Start: 2020-07-31 | End: 2020-08-05 | Stop reason: HOSPADM

## 2020-07-31 RX ORDER — OXYCODONE HYDROCHLORIDE 5 MG/1
2.5 TABLET ORAL EVERY 4 HOURS PRN
Status: DISCONTINUED | OUTPATIENT
Start: 2020-07-31 | End: 2020-08-05 | Stop reason: HOSPADM

## 2020-07-31 RX ORDER — OXYCODONE HYDROCHLORIDE 5 MG/1
5 TABLET ORAL EVERY 4 HOURS PRN
Status: DISCONTINUED | OUTPATIENT
Start: 2020-07-31 | End: 2020-08-05 | Stop reason: HOSPADM

## 2020-07-31 RX ORDER — FENTANYL CITRATE 50 UG/ML
INJECTION, SOLUTION INTRAMUSCULAR; INTRAVENOUS AS NEEDED
Status: DISCONTINUED | OUTPATIENT
Start: 2020-07-31 | End: 2020-07-31 | Stop reason: SURG

## 2020-07-31 RX ADMIN — GABAPENTIN 100 MG: 100 CAPSULE ORAL at 09:21

## 2020-07-31 RX ADMIN — DIPHENHYDRAMINE HYDROCHLORIDE 12.5 MG: 50 INJECTION, SOLUTION INTRAMUSCULAR; INTRAVENOUS at 17:22

## 2020-07-31 RX ADMIN — OXYCODONE HYDROCHLORIDE 5 MG: 5 TABLET ORAL at 12:14

## 2020-07-31 RX ADMIN — METHOCARBAMOL TABLETS 500 MG: 500 TABLET, COATED ORAL at 00:07

## 2020-07-31 RX ADMIN — DIPHENHYDRAMINE HYDROCHLORIDE 12.5 MG: 50 INJECTION, SOLUTION INTRAMUSCULAR; INTRAVENOUS at 23:43

## 2020-07-31 RX ADMIN — ROPIVACAINE HYDROCHLORIDE: 5 INJECTION, SOLUTION EPIDURAL; INFILTRATION; PERINEURAL at 18:00

## 2020-07-31 RX ADMIN — CELECOXIB 200 MG: 200 CAPSULE ORAL at 17:21

## 2020-07-31 RX ADMIN — ATORVASTATIN CALCIUM 20 MG: 20 TABLET, FILM COATED ORAL at 17:22

## 2020-07-31 RX ADMIN — CELECOXIB 200 MG: 200 CAPSULE ORAL at 12:13

## 2020-07-31 RX ADMIN — FENTANYL CITRATE 50 MCG: 50 INJECTION, SOLUTION INTRAMUSCULAR; INTRAVENOUS at 15:21

## 2020-07-31 RX ADMIN — OXYCODONE HYDROCHLORIDE 5 MG: 5 TABLET ORAL at 23:39

## 2020-07-31 RX ADMIN — FENTANYL CITRATE 50 MCG: 50 INJECTION, SOLUTION INTRAMUSCULAR; INTRAVENOUS at 15:09

## 2020-07-31 RX ADMIN — LORATADINE 10 MG: 10 TABLET ORAL at 12:14

## 2020-07-31 RX ADMIN — OXYCODONE HYDROCHLORIDE 2.5 MG: 5 TABLET ORAL at 06:43

## 2020-07-31 RX ADMIN — HEPARIN SODIUM 5000 UNITS: 5000 INJECTION INTRAVENOUS; SUBCUTANEOUS at 06:45

## 2020-07-31 RX ADMIN — HYDROMORPHONE HYDROCHLORIDE 0.2 MG: 1 INJECTION, SOLUTION INTRAMUSCULAR; INTRAVENOUS; SUBCUTANEOUS at 13:25

## 2020-07-31 RX ADMIN — SENNOSIDES AND DOCUSATE SODIUM 1 TABLET: 8.6; 5 TABLET ORAL at 17:22

## 2020-07-31 RX ADMIN — AMLODIPINE BESYLATE 5 MG: 5 TABLET ORAL at 09:21

## 2020-07-31 RX ADMIN — GABAPENTIN 100 MG: 100 CAPSULE ORAL at 17:22

## 2020-07-31 RX ADMIN — HEPARIN SODIUM 5000 UNITS: 5000 INJECTION INTRAVENOUS; SUBCUTANEOUS at 21:56

## 2020-07-31 RX ADMIN — SENNOSIDES AND DOCUSATE SODIUM 1 TABLET: 8.6; 5 TABLET ORAL at 09:21

## 2020-07-31 NOTE — ASSESSMENT & PLAN NOTE
Comminuted displaced fractures of the posterior left 7th through 11th ribs are seen  Mildly displaced fractures of the left lateral 5th through 8th ribs are seen  With tiny left-sided hydropneumothorax  · Patient on supplemental oxygen via nasal cannula  · APS following, input appreciated  Patient will have epidural replaced today for pain control secondary to epidural catheter falling out last night    · Continue incentive spirometer  · Management per primary team

## 2020-07-31 NOTE — ASSESSMENT & PLAN NOTE
- Small left-sided hemopneumothorax, present on admission   - No intervention necessary at this time, and no intervention anticipated  - Await repeat chest x-ray this AM for further evaluation of small left-sided hemopneumothorax  - Management of rib fractures as noted

## 2020-07-31 NOTE — PRE-PROCEDURE INSTRUCTIONS
Found that the patient's epidural had become dislodged at 2145 and notified Jarad Baez MD of trauma, who came to the bedside to assess the patient  Per Dr Matty Hernández, the epidural was completely dislodged, and was ordered to stop the continuous epidural infusion  Patient only complaining of 4/10 pain at this time, and she states she does not want to take any pain medications for it right now  Q2 neuro assessments ordered  No change in neuro assessment  Wrapped end of epidural tip with bandaid per Dr Matty Hernández  Will continue to follow plan of care  0030 - Pt refusing all assessments overnight  She stated, "If anyone wakes me up while I'm sleeping, there will be hell to pay " Educated patient on importance of allowing nursing staff to complete assessments, but patient refused education   Jarad Baez MD of trauma notified and aware of patient's refusal

## 2020-07-31 NOTE — PLAN OF CARE
Problem: OCCUPATIONAL THERAPY ADULT  Goal: Performs self-care activities at highest level of function for planned discharge setting  See evaluation for individualized goals  Description  Treatment Interventions: ADL retraining, Functional transfer training, Endurance training, Cognitive reorientation, Patient/family training, Equipment evaluation/education, Compensatory technique education, Activityengagement, Energy conservation          See flowsheet documentation for full assessment, interventions and recommendations  Note:   Limitation: Decreased ADL status, Decreased endurance, Decreased self-care trans, Decreased high-level ADLs  Prognosis: Good  Assessment: Pt is a 80 y o  female who was admitted to [facility] on 7/29/2020 with Closed fracture of multiple ribs of left side, T12 comp fx s/p fall down steps   Pt's problem list also includes PMH of HTN and hld, LE chronic pain  At baseline pt was completing basic adls independently with assist from family/home aide PRN and ambulates with [facility] - has assist for iadls  Pt lives with son in [place] however was visiting dtr locally for the summer  Currently pt requires moderate assist for overall ADLS and min to mod assist for functional mobility/transfers  Pt currently presents with impairments in the following categories -difficulty performing ADLS, decreased initiation and engagement  and health management  activity tolerance, endurance and standing balance/tolerance  These impairments, as well as pt's fatigue, SOB, pain, spinal precautions and risk for falls  limit pt's ability to safely engage in all baseline areas of occupation, includingbathing, dressing, toileting, functional mobility/transfers, social participation  and leisure activities  From OT standpoint, recommend home with family support upon D/C  OT will continue to follow to address the below stated goals        OT Discharge Recommendation: Return to previous environment with social support

## 2020-07-31 NOTE — PROGRESS NOTES
Progress Note - Ally Barrera 1935, 80 y o  female MRN: [id number]    Unit/Bed#: [facility] 605-01 Encounter: [id number]    Primary Care Provider: Peg Carcamo   Date and time admitted to hospital: 7/29/2020  8:57 PM  Fall  Assessment & Plan  - Status post fall with the below noted injuries  - Fall precautions   - PT and OT evaluation and treatment as indicated  - utheydi Lockhart [facility] Medicine evaluation and treatment as indicated  - Case management consult for disposition planning  * Closed fracture of multiple ribs of left side  Assessment & Plan  - Multiple left-sided rib fractures including posteriorly 7-11 and lateral 5-8 with associated small left-sided hemopneumothorax  - Continue rib fracture protocol   - APS evaluation for multimodal analgesia regimen    - Epidural catheter placed 7/30, fell out overnight 7/30, oral regimen increased until APS can re-evaluate today  - Continue to encourage incentive spirometer use and adequate pulmonary hygiene   - PT and OT evaluation and treatment as indicated  - Await repeat chest x-ray this AM for further evaluation of small left-sided hemopneumothorax  Acute pain due to trauma  Assessment & Plan  - Acute pain due to multiple traumatic injuries, present on admission  Patient with history of chronic pain and takes Celebrex and Neurontin at baseline  -APS following, recommendations appreciated  -epidural catheter inserted 7/30, accidentally fell out overnight 7/30, PO pain medication regimen restarted until APS can re-evaluate and optimize regimen as patient does not want another epidural   - anesthesia aware of catheter out, catheter tip intact, minimal drainage   - closely monitoring for epidural hematoma as SQ heparin dose administered shortly before catheter noted to be out   - Continue to monitor her pain and make adjustments as indicated      Hemopneumothorax on left  Assessment & Plan  - Small left-sided hemopneumothorax, present on admission   - No intervention necessary at this time, and no intervention anticipated  - Await repeat chest x-ray this AM for further evaluation of small left-sided hemopneumothorax  - Management of rib fractures as noted  Hypertension  Assessment & Plan  - Chronic history hypertension   - Continue home medication regimen including Norvasc and metoprolol   - Outpatient follow-up with primary care provider  Closed wedge compression fracture of T12 vertebra (HCC)  Assessment & Plan  - T12 compression fracture  - Appreciate Neurosurgery evaluation and recommendations  Non operative management recommended  - Maintain TLSO brace whenever upright and/or out of bed, or if patient unable to tolerate secondary to multiple rib fractures can wear brace p r n  For comfort  - Await upright thoracolumbar spine x-rays per Neurosurgery recommendations  - Continue multimodal analgesic regimen   - PT and OT evaluation and treatment as indicated  - Monitor neurologic exam     Disposition: Pending PT/OT recs, pain management and medical stability       SUBJECTIVE:  Chief Complaint: "My pain is okay"    Subjective:  Patient is epidural catheter fell out overnight, patient reports pain been tolerable since epidural and would like to continue with just oral pain medication instead of having another epidural placed  Reports rib pain is greater than back pain but feels it is tolerable, especially while lying down  Denies shortness of breath, chest pain, palpitations, abdominal pain, nausea, vomiting, numbness or weakness in extremities  Patient states she is not interested in acute rehab upon discharge, she would like to return home to her daughter's house where she is currently visiting from [place]        OBJECTIVE:     Meds/Allergies     Current Facility-Administered Medications:     acetaminophen (TYLENOL) tablet 650 mg, 650 mg, Oral, Q6H PRN, Krista Dobson MD    amLODIPine (NORVASC) tablet 5 mg, 5 mg, Oral, Daily, Annette Cueva, MD    atorvastatin (LIPITOR) tablet 20 mg, 20 mg, Oral, After Jess Yoon MD, 20 mg at 07/30/20 2139    celecoxib (CeleBREX) capsule 200 mg, 200 mg, Oral, BID, Samy Cueva MD, 200 mg at 07/30/20 2140    diphenhydrAMINE (BENADRYL) injection 12 5 mg, 12 5 mg, Intravenous, Q6H PRN, Samy Cueva MD, 12 5 mg at 07/30/20 1742    gabapentin (NEURONTIN) capsule 100 mg, 100 mg, Oral, HS, Florencia Levi MD, 100 mg at 07/30/20 2139    heparin (porcine) subcutaneous injection 5,000 Units, 5,000 Units, Subcutaneous, Q8H [address], Dotty Quinonez PA-C, 5,000 Units at 07/31/20 0645    HYDROmorphone (DILAUDID) injection 0 2 mg, 0 2 mg, Intravenous, Q4H PRN, Malissa Acosta MD    lactated ringers bolus 500 mL, 500 mL, Intravenous, Once PRN, Malissa Acosta MD    oxyCODONE (ROXICODONE) IR tablet 2 5 mg, 2 5 mg, Oral, Q4H PRN, Johanne Sethi MD, 2 5 mg at 07/31/20 8534    oxyCODONE (ROXICODONE) IR tablet 5 mg, 5 mg, Oral, Q4H PRN, Johanne Sethi MD    polyethylene glycol [facility]) packet 17 g, 17 g, Oral, Daily PRN, Dotty Quinonez PA-C    senna-docusate sodium (SENOKOT S) 8 6-50 mg per tablet 1 tablet, 1 tablet, Oral, BID, Dotty Quinonez PA-C     Vitals:   Vitals:    07/31/20 0336   BP:    Pulse:    Resp:    Temp:    SpO2: 97%       Intake/Output:  I/O       07/29 0701 - 07/30 0700 07/30 0701 - 07/31 0700    I V  (mL/kg)  544 (10)    Total Intake(mL/kg)  544 (10)    Urine (mL/kg/hr)  523 (0 4)    Total Output  523    Net  +21          Unmeasured Urine Occurrence  1 x           Nutrition/GI Proph/Bowel Reg: Regular house, Senokot, MiraLax p r n  Physical Exam:   GENERAL APPEARANCE: Patient seen lying in bed, appears comfortable  In no acute distress  NEURO: GCS 15  No focal deficits  HEENT: Normocephalic, atraumatic  PEERL, EOMI  CV: RRR, no murmurs, rubs or gallops  LUNGS: CTABL, no wheezes, rhonchi, rales, or crackles  GI: Soft, non-tender, non-distended   Bowel sounds active   MSK: Moves all extremities  Strength 5/5 throughout and equal bilaterally  Sensation intact  SKIN: Warm and dry  Prior epidural site clean and dry without drainage, soft no external hematoma    Invasive Devices     Peripheral Intravenous Line            Peripheral IV 07/29/20 Right Antecubital 1 day          Epidural Line            Epidural Catheter 07/30/20 less than 1 day          Drain            External Urinary Catheter less than 1 day                 Lab Results: Results: I have personally reviewed pertinent reports  Imaging/EKG Studies: Results: I have personally reviewed pertinent reports     and I have personally reviewed pertinent films in PACS  Other Studies: N/A  VTE Prophylaxis: Sequential compression device (Venodyne)  and Heparin

## 2020-07-31 NOTE — ORTHOTIC NOTE
Orthotic Note            Date: 7/31/2020      Patient Name: Manny Escoto            Reason for Consult:  Patient Active Problem List   Diagnosis    Fall    Closed fracture of multiple ribs of left side    Hemopneumothorax on left    Closed wedge compression fracture of T12 vertebra (HCC)    Acute pain due to trauma    Hypertension   [employer] [place] [employer] [place] [employer]     [employer] fit [address] onto pt while sitting EOB  Sized and measured pt to a size medium for optimal fit and comfort  Pt tolerated fitting well  Pt able to demonstrate tightening of brace without difficulty  Will continue to follow up daily so pt is more comfortable with brace  RN aware  Recommendations:  Please call Agent Partner ext [phone] in regards to any bracing instructions and/or adjustments       [address] [facility] Restorative Technician, BS

## 2020-07-31 NOTE — ASSESSMENT & PLAN NOTE
- Multiple left-sided rib fractures including posteriorly 7-11 and lateral 5-8 with associated small left-sided hemopneumothorax  - Continue rib fracture protocol   - APS evaluation for multimodal analgesia regimen    - Epidural catheter placed 7/30, fell out overnight 7/30, oral regimen increased until APS can re-evaluate today  - Continue to encourage incentive spirometer use and adequate pulmonary hygiene   - PT and OT evaluation and treatment as indicated  - Await repeat chest x-ray this AM for further evaluation of small left-sided hemopneumothorax

## 2020-07-31 NOTE — PHYSICAL THERAPY NOTE
PHYSICAL THERAPY EVALUATION          Patient Name: Jarrod CHIU Date: 7/31/2020 07/31/20 1035   Note Type   Note type Eval only   Pain Assessment   Pain Assessment Tool 0-10   Pain Score 8   Pain Location/Orientation Orientation: Left; Location: Rib Cage   Pain Onset/Description Onset: Ongoing; Descriptor: Aching;Descriptor: Virgia Charandrea; Descriptor: Sore   Effect of Pain on Daily Activities Difficulty taking deep breaths   Patient's Stated Pain Goal No pain   Hospital Pain Intervention(s) Repositioned; Ambulation/increased activity   Multiple Pain Sites Yes  (back pain)   Home Living   Type of [address] [place] [address] One level;Stairs to enter with rails  (2 KENDAL)   Bathroom Shower/Tub Tub/shower unit   Bathroom Toilet Standard   Bathroom Equipment Grab bars in shower;Grab bars around toilet   [address] [facility]   Additional Comments Pt plans to D/C to dtr's house with above set up  Pt normally lives alone in a [facility], 1 KENDAL  Prior Function   Level of [place] Independent with ADLs and functional mobility   Lives With Daughter  (Pt will D/C to [facility] house  Someone will be home at all times)   Receives Help From [facility], [address] in the last 6 months 1 to 4  (1)   Vocational Retired   Comments Pt reports being fully independent prior to admission  Pt was using cane for longer distances  Restrictions/Precautions   Weight Bearing Precautions Per Order No   Braces or Orthoses TLSO  (Backpack TLSO)   Other Precautions Multiple lines;O2;Fall Risk;Pain;Spinal precautions   General   Additional Pertinent History Pt O2 98% on 2L O2 via NC pre-activity  Pt SpO2 in mid 90s on RA during and post-activity      Family/Caregiver Present Yes  (Dtr present t/o session)   Cognition   Arousal/Participation Alert   Attention Attends with cues to redirect   Orientation Level Oriented X4   Memory Within functional limits   Following Commands Follows one step commands with increased time or repetition   RLE Assessment   RLE Assessment X  (chronic decreased ROM 2* leg lengthening surgery per pt)   Strength RLE   RLE Overall Strength 3+/5   LLE Assessment   LLE Assessment WFL   Strength LLE   LLE Overall Strength 4-/5   Bed Mobility   Rolling R 5  Supervision   Additional items Bedrails; Increased time required;Verbal cues   Supine to Sit 4  Minimal assistance   Additional items Assist x 1;Bedrails; Increased time required;Verbal cues   Transfers   Sit to Stand 4  Minimal assistance   Additional items Assist x 1; Increased time required;Verbal cues   Stand to Sit 4  Minimal assistance   Additional items Assist x 1; Armrests; Increased time required;Verbal cues   Toilet transfer 3  Moderate assistance   Additional items Assist x 1; Increased time required;Standard toilet  (grab bars)   Additional Comments Pt performed transfers using RW  Ambulation/Elevation   Gait pattern Wide KALE; Antalgic; Excessively slow   Gait Assistance 4  Minimal assist   Additional items Assist x 1;Verbal cues; Tactile cues   Assistive Device Rolling walker   Distance 10 ft   Stair Management Assistance Not tested   Balance   Static Sitting Fair   Static Standing Fair -   Ambulatory Poor +   Endurance Deficit   Endurance Deficit Yes   Endurance Deficit Description Pt presents on 2L O2 via NC   Activity Tolerance   Activity Tolerance Patient limited by fatigue;Patient limited by pain   Medical Staff Made Aware Pt ok to mobilize per nsg   Nurse Made Skip Beaver, RN   Assessment   Prognosis Good   Problem List Decreased strength;Decreased range of motion;Decreased endurance; Impaired balance;Decreased mobility;Pain;Orthopedic restrictions   Assessment Pt is an 80 y o  Female presenting to [facility] s/p fall down outside steps while visiting dtr (- headstrike, - LOC)   Pt was admitted with primary diagnoses of closed fracture of multiple ribs of L side, acute pain due to trauma, hemopneumothorax on L, closed wedge compression fracture of T12 vertebra and other active problems including HTN  Pt's personal factors affecting evaluation includes primary residence in [place] and steps to negotiate at home  Pt seen for high complexity PT eval due to ongoing medical management of admitting diagnosis, supplemental O2 via NC, increased reliance on more restrictive AD, decreased functional ability and activity tolerance compared to baseline, pain and fall risk  Upon evaluation, pt was resting in bed on 2L O2 with dtr present in room  Pt required Min Ax1 with bed mobility, sit to stand transfers and ambulation using cane  Pt initially resistive to TLSO brace  PT/restorative tech educated pt on benefits of wearing brace and pt became agreeable  While seated EOB, restorative tech fitted TLSO with minimal complaints from pt and supervision from PT  Pt required Mod Ax1 for toilet transfer using grab bars and RW  Of note, pt unable to reach 90 degrees flexion with R knee which she attributes to various leg lengthening surgeries performed "a long time ago"  Based on PT eval, pt's current list of problems includes decreased BLE strength and ROM, decreased endurance, impaired balance, decreased mobility and pain  PT will continue to follow pt for remainder of stay to address these impairments  At conclusion of session, pt was left up in chair with all needs within reach and dtr present  PT currently recommending D/C to dtr's house with increased support at D/C  Pt and dtr deny questions or safety concerns with recommendation  Barriers to Discharge None   Goals   Patient Goals To go home   STG Expiration Date 08/10/20   Short Term Goal #1 In 10 days, pt will  Lindsey Arcos 1) perform all aspects of bed mobility with supervision in order to reduce caregiver burden  2) perform transfers with supervision in order to increase functional independence   3) perform toilet transfers with Min Ax1 in order to safely use standard sized toilet while reducing caregiver burden  4) ambulate 150 ft with supervision and least restrictive AD in order to return to PLOF  5) negotiate 2 steps with supervision in order to safely enter home  6) increase BLE strength by 1/2 grade in order to perform transfers with greater ease  7) increase ambulatory balance by 1 grade in order to reduce risk of falls  Plan   Treatment/Interventions Functional transfer training;LE strengthening/ROM; Elevations; Therapeutic exercise; Endurance training;Patient/family training;Equipment eval/education; Bed mobility;Gait training;Spoke to nursing;OT   PT Frequency Other (Comment)  (3-6x/wk)   Recommendation   PT Discharge Recommendation Return to previous environment with social support  (D/C to dtr's home with OP PT when appropriate)   Equipment Recommended Walker  (RW)   PT - OK to Discharge No  (Pending further ambulatory distance and stair trial)   Additional Comments Pt and dtr deny any questions or safety concerns with D/C recommendation     Modified [place] Scale   Modified Belén Scale 4   Barthel Index   Feeding 10   Bathing 0   Grooming Score 5   Dressing Score 5   Bladder Score 0   Bowels Score 10   Toilet Use Score 5   Transfers (Bed/Chair) Score 10   Mobility (Level Surface) Score 0   Stairs Score 0   Barthel Index Score 45     Lisa Reis, SPT

## 2020-07-31 NOTE — PROGRESS NOTES
Progress Note - Acute Pain Service    Evelyne Olivo 80 y o  female MRN: [id number]  Unit/Bed#: [facility] 605-01 Encounter: [id number]      Assessment:   Principal Problem:    Closed fracture of multiple ribs of left side  Active Problems:    Fall    Hemopneumothorax on left    Closed wedge compression fracture of T12 vertebra (HCC)    Acute pain due to trauma    Hypertension    Evelyne Olivo is a 80 y o  female  with multiple left-sided rib fractures  Thoracic epidural was placed yesterday for pain control  Overnight patient reported severe itching in her upper extremities chest wall and back and subsequently epidural became dislodged  Epidural tip is intact, thoracic epidural site without hematoma, no redness or drainage  Motor/sensory neuro exam within normal limits, no deficits  Prior to epidural removal, pain was well controlled, patient was able to take a deep breath and shortness of breath had resolved  Plan:   · Celebrex 200 mg twice a day, home medication  · Okay to continue with epidural  · Gabapentin 100 mg twice a day  · Start Claritin 10 mg daily for itching associated with epidural  · Okay to use Benadryl 12 5 mg IV every 6 hours as needed for itching   · Oxycodone 2 5 mg every 4 hours as needed for moderate pain  · Oxycodone 5 mg every 4 hours as needed for severe pain  · Dilaudid 0 2 mg IV every 4 hours as needed for breakthrough pain    Platelet count 339  DVT prophylaxis: SQ heparin last dose 07/31/2020 at 6:45 a m  This morning  Will plan to replace thoracic epidural later today and removed opioid from epidural bag and start with a Ropivacaine 0 1% infusion  Patient can continue with Claritin and Benadryl for itching  Discussed with patient and daughter who are agreeable to epidural replacement  Treatment recommendations and plan of care discussed with primary care service and anesthesiologist     Yamila Mckeon will continue to follow   Please call  / [phone] or [facility] Acute Pain Service - [facility] (/ between [phone] and on weekends) with questions or concerns    Pain History  Current pain location(s):  Left chest wall  Pain Scale:   0-6  24 hour history:  Patient resting in bed, thoracic epidural was discontinued overnight  Patient had reported severe itching in her upper extremities and anterior and posterior thoracic region  Left chest wall pain is present, worse with deep inspiration or repositioning in bed  Incentive spirometer 500 mL, continues to require oxygen via nasal cannula  Prior to epidural removal patient had well controlled pain and was able to take deep breaths  Also reports feeling short of breath at times      Opioid requirement previous 24 hours:  Oxycodone 7 5mg    Meds/Allergies   all current active meds have been reviewed and current meds:   Current Facility-Administered Medications   Medication Dose Route Frequency    acetaminophen (TYLENOL) tablet 650 mg  650 mg Oral Q6H PRN    amLODIPine (NORVASC) tablet 5 mg  5 mg Oral Daily    atorvastatin (LIPITOR) tablet 20 mg  20 mg Oral After Dinner    celecoxib (CeleBREX) capsule 200 mg  200 mg Oral BID    diphenhydrAMINE (BENADRYL) injection 12 5 mg  12 5 mg Intravenous Q6H PRN    gabapentin (NEURONTIN) capsule 100 mg  100 mg Oral BID    heparin (porcine) subcutaneous injection 5,000 Units  5,000 Units Subcutaneous Q8H [facility]    HYDROmorphone (DILAUDID) injection 0 2 mg  0 2 mg Intravenous Q4H PRN    lactated ringers bolus 500 mL  500 mL Intravenous Once PRN    loratadine (CLARITIN) tablet 10 mg  10 mg Oral Daily    oxyCODONE (ROXICODONE) IR tablet 2 5 mg  2 5 mg Oral Q4H PRN    oxyCODONE (ROXICODONE) IR tablet 5 mg  5 mg Oral Q4H PRN    polyethylene glycol (MIRALAX) packet 17 g  17 g Oral Daily PRN    ropivacaine 0 1 % PCEA   Epidural Continuous    senna-docusate sodium (SENOKOT S) 8 6-50 mg per tablet 1 tablet  1 tablet Oral BID       No Known Allergies    Objective     Temp:  [98 4 °F (36 9 °C)-99 5 °F (37 5 °C)] 99 1 °F (37 3 °C)  HR:  [85-97] 88  Resp:  [17-18] 18  BP: (129-153)/(55-73) 142/72    Physical Exam   Constitutional: She is oriented to person, place, and time  She is active and cooperative  Non-toxic appearance  No distress  HENT:   Head: Normocephalic and atraumatic  Eyes: EOM are normal    Neck: Normal range of motion  Pulmonary/Chest: No tachypnea and no bradypnea  No respiratory distress  Musculoskeletal: She exhibits no edema  Neurological: She is alert and oriented to person, place, and time  She has normal strength  No sensory deficit  Skin: Skin is warm and dry  She is not diaphoretic  Psychiatric: She has a normal mood and affect  Her speech is normal and behavior is normal  Her mood appears not anxious  She is not agitated  Vitals reviewed  Lab Results:   Results from last 7 days   Lab Units 07/30/20  1001   WBC Thousand/uL 7 82   HEMOGLOBIN g/dL 10 8*   HEMATOCRIT % 32 4*   PLATELETS Thousands/uL 202      Results from last 7 days   Lab Units 07/30/20  0549   POTASSIUM mmol/L 4 4   CHLORIDE mmol/L 105   CO2 mmol/L 27   BUN mg/dL 13   CREATININE mg/dL 0 78   CALCIUM mg/dL 9 2       Counseling / Coordination of Care  Total floor / unit time spent today 20 minutes  Greater than 50% of total time was spent with the patient and / or family counseling and / or coordination of care  A description of the counseling / coordination of care:  Reviewed plan of care medications with patient, daughter, RN staff, anesthesiologist and Primary Care Service  Please note that the APS provides consultative services regarding pain management only  With the exception of ketamine and epidural infusions and except when indicated, final decisions regarding starting or changing doses of analgesic medications are at the discretion of the consulting service  Off hours consultation and/or medication management is generally not available      SHIRLEY Rich  Acute Pain Service

## 2020-07-31 NOTE — ASSESSMENT & PLAN NOTE
· Continue fall precautions  · PT/OT recommending home when medically stable  · Geriatrics following, input appreciated

## 2020-07-31 NOTE — OCCUPATIONAL THERAPY NOTE
Occupational Therapy Evaluation     Patient Name: Jarrod BARBOZA Date: 7/31/2020  Problem List  Principal Problem:    Closed fracture of multiple ribs of left side  Active Problems:    Fall    Hemopneumothorax on left    Closed wedge compression fracture of T12 vertebra (HCC)    Acute pain due to trauma    Hypertension    Past Medical History  Past Medical History:   Diagnosis Date    Hypercholesteremia     Hypertension     Spinal stenosis      Past Surgical History  Past Surgical History:   Procedure Laterality Date    HIP ARTHROSCOPY Bilateral     HYSTERECTOMY      JOINT REPLACEMENT             07/31/20 1025   Note Type   Note type Eval/Treat   Restrictions/Precautions   Weight Bearing Precautions Per Order No   Braces or Orthoses TLSO  (backpack TLSO)   Other Precautions Cognitive; Chair Alarm; Bed Alarm;O2;Fall Risk;Pain;Multiple lines;Spinal precautions   Pain Assessment   Pain Assessment Tool FLACC  (Simultaneous filing  User may not have seen previous data )   Pain Score 8   Pain Location/Orientation Location: Back; Location: Rib Cage   Hospital Pain Intervention(s) Repositioned; Ambulation/increased activity; Emotional support;Relaxation technique   Pain Rating: FLACC (Rest) - Face 0   Pain Rating: FLACC (Rest) - Legs 0   Pain Rating: FLACC (Rest) - Activity 0   Pain Rating: FLACC (Rest) - Cry 0   Pain Rating: FLACC (Rest) - Consolability 0   Score: FLACC (Rest) 0   Pain Rating: FLACC (Activity) - Face 0   Pain Rating: FLACC (Activity) - Legs 0   Pain Rating: FLACC (Activity) - Activity 0   Pain Rating: FLACC (Activity) - Cry 0   Pain Rating: FLACC (Activity) - Consolability 0   Score: FLACC (Activity) 0   Home Living   Type of Home House   Home Layout One level   Bathroom Shower/Tub Tub/shower unit   Bathroom Toilet Standard   Bathroom Equipment Grab bars in shower;Grab bars around toilet   Home [place]   Prior Function   Level of [place] Independent with ADLs and functional mobility; Needs assistance with IADLs   Lives With Daughter; Son   Jorge Help From Family   ADL Assistance Independent   IADLs Needs assistance   Falls in the last 6 months 1 to 4   Vocational Retired   Lifestyle   Autonomy I basic adls and mobility with spc - assist with iadls - has HHA 2x/wk at son's home in [place] - was staying with dtr for the summer locally - plans to return to dtrs home post d/c    Reciprocal Relationships supportive family - will not be alone at home post d/c    Service to Others retired   Intrinsic Gratification mostly sedentary    Subjective   Subjective "I don't want to wear that thing" in re: to [address]  Independent   Grooming Assistance 5  Supervision/Setup   UB Bathing Assistance 4  Minimal Assistance   LB [address] 3  Moderate Assistance   [address]  [address] 3  Moderate [address]  4  Minimal Assistance   Bed Mobility   Rolling R 5  Supervision   Supine to Sit 4  Minimal assistance   Transfers   Sit to Stand 4  Minimal assistance   Stand to Sit 4  Minimal assistance   Stand pivot 4  Minimal assistance   Toilet transfer 3  Moderate assistance   Functional Mobility   Functional Mobility 4  Minimal assistance   Additional items SPC   Balance   Static Sitting Fair   Dynamic Sitting Fair   Static Standing Fair -   Dynamic Standing Poor +   Ambulatory Poor +   Activity Tolerance   Activity Tolerance Patient limited by fatigue;Patient limited by pain   RUE Assessment   RUE Assessment WFL   LUE Assessment   LUE Assessment X  (limited external rotation )   Cognition   Arousal/Participation Alert; Cooperative   Attention Attends with cues to redirect   Orientation Level Oriented to person;Oriented to place;Oriented to time;Oriented to situation   Memory Decreased recall of precautions   Following Commands Follows one step commands with increased time or repetition   Assessment   Limitation Decreased ADL status; Decreased endurance;Decreased self-care trans;Decreased high-level ADLs   Prognosis Good   Assessment Pt is a 80 y o  female who was [facility] 7/[facility]/2020 with Closed fracture of multiple ribs of left side, T12 comp fx s/p fall down steps   Pt's problem list also includes PMH of HTN and hld, LE chronic pain  At baseline pt was completing basic adls independently with assist from family/home aide PRN and ambulates with [facility] assist for iadls  Pt lives with son in [place] however was visiting dtr locally for the summer  Currently pt requires moderate assist for overall ADLS and min to mod assist for functional mobility/transfers  Pt currently presents with impairments in the following categories -difficulty performing ADLS, decreased initiation and engagement  and health management  activity tolerance, endurance and standing balance/tolerance  These impairments, as well as pt's fatigue, SOB, pain, spinal precautions and risk for falls  limit pt's ability to safely engage in all baseline areas of occupation, includingbathing, dressing, toileting, functional mobility/transfers, social participation  and leisure activities  From OT standpoint, recommend home with family support upon D/C  OT will continue to follow to address the below stated goals  Goals   Patient Goals go home    LTG Time Frame 7-10   Long Term Goal #1 refer to established goals below   Plan   Treatment Interventions ADL retraining;Functional transfer training; Endurance training;Cognitive reorientation;Patient/family training;Equipment evaluation/education; Compensatory technique education; Activityengagement; Energy conservation   Goal Expiration Date 08/10/20   OT Frequency 3-5x/wk   Recommendation   OT Discharge Recommendation Return to previous environment with social support       OCCUPATIONAL THERAPY GOALS:    *SBA adls after setup with use of AE PRN  *SBA toileting and clothing management   *SBA functional mobility and transfers to/from all surfaces with fair to fair+ dynamic balance and safety for participation in dynamic adls and iadl tasks   *Demonstrate fair to fair+ carryover with safe use of AD, management of backpack TLSO and use of spinal precautions/proper body mechanics during functional tasks   *Assess DME needs   *Increase activity tolerance to 35-40 minutes for participation in adls and enjoyable activities  *Pt to participate in ongoing functional cognitive assessment with good attention/participation to assist with safe d/c recommendations    Hussain Myles OT

## 2020-07-31 NOTE — ANESTHESIA PROCEDURE NOTES
Epidural Block    Patient location during procedure: holding area  Start time: 7/31/2020 3:50 PM  Reason for block: procedure for pain and at surgeon's request  Staffing  Anesthesiologist: Cintia Wilburn MD  Resident/CRNA: Werner Avila CRNA  Performed: resident/CRNA   Preanesthetic Checklist  Completed: patient identified, site marked, surgical consent, pre-op evaluation, timeout performed, IV checked, risks and benefits discussed and monitors and equipment checked  Epidural  Patient position: sitting  Prep: ChloraPrep  Patient monitoring: cardiac monitor and frequent blood pressure checks  Approach: midline  Location: thoracic (1-12)  Injection technique: REJI saline  Needle  Needle type: Tuohy   Needle gauge: 18 G  Catheter type: side hole  Catheter size: 20 G  Catheter at skin depth: 10 cm  Test dose: negativenegative aspiration for CSF, negative aspiration for heme and no paresthesia on injection  patient tolerated the procedure well with no immediate complications  Additional Notes  Multiple attempts due to osseous spine, patient tolerated well  No other issues   spo2 and bp monitored throughout

## 2020-07-31 NOTE — PROGRESS NOTES
Progress Note - Dev Marquis 1935, 80 y o  female MRN: [id number]    Unit/Bed#: [facility] 605-01 Encounter: [id number]    Primary Care Provider: Mary Hughes   Date and time admitted to hospital: 7/29/2020  8:57 PM        Closed wedge compression fracture of T12 vertebra [facility])  Assessment & Plan  S/p mechanical fall down 1 step striking her back on a piece of furniture  Patient reports taking baby aspirin daily but has roughly stopped for a week  Imaging:    CT chest abdomen pelvis 07/29/2020:Tiny left-sided hydropneumothorax  Multiple left-sided rib fractures as described above  Compression deformity of T12 vertebral body  Thoracolumbar x-ray 07/31/2020:Superior endplate compression fracture of T12 is again identified, not significant changed with approximately 30-40% height loss  Diffuse degenerative changes throughout the thoracolumbar spine  Plan:  · Continue neurological exam  · Reviewed imaging with patient and daughter at bedside  · Upright thoracolumbar x-ray stable  · TLSO brace ordered to be worn when OOB or HOB >45 degrees, if patient unable to tolerate can wear brace prn for comfort secondary to multiple left-sided rib fractures  · No neurosurgical intervention warranted at this time  · Medical management and pain control per primary team  · Mobilize patient as tolerated  · PT/OT recommending home when medically stable  · DVT PPX: SCDs and SQ heparin    Neurosurgery will sign off, patient will follow-up in 2 weeks with upright thoracolumbar spine x-ray, call with any questions or concerns  Fall  Assessment & Plan  · Continue fall precautions  · PT/OT recommending home when medically stable  · Geriatrics following, input appreciated    * Closed fracture of multiple ribs of left side  Assessment & Plan  Comminuted displaced fractures of the posterior left 7th through 11th ribs are seen  Mildly displaced fractures of the left lateral 5th through 8th ribs are seen    With tiny left-sided hydropneumothorax  · Patient on supplemental oxygen via nasal cannula  · APS following, input appreciated  Patient will have epidural replaced today for pain control secondary to epidural catheter falling out last night  · Continue incentive spirometer  · Management per primary team    Subjective/Objective      Chief Complaint: "My back doesn't hurt"    Subjective:  Patient denies any current back pain, but does admit with movement her left ribs and back hurt but are tolerable  She reports epidural catheter overnight helped a lot with her pain  She reports overnight she got very itchy and started pulling off the tape and did not realize she was pulling out the catheter  She denies any headaches, dizziness, blurry vision, chest pain, shortness of breath, abdominal pain, nausea, vomiting, diarrhea, no problems with bowel or bladder, no new weakness or numbness/tingling  Patient reports no BM but is passing gas  Patient has purwick in place  Patient reports she was able to ambulate with a cane from chair to bathroom and back  Objective:  Patient comfortably lying in bed, NAD  I/O       07/29 0701 - 07/30 0700 07/30 0701 - 07/31 0700 07/31 0701 - 08/01 0700    P  O   240 110    I V  (mL/kg)  544 (10)     Total Intake(mL/kg)  784 (14 4) 110 (2)    Urine (mL/kg/hr)  723 (0 6)     Total Output  723     Net  +61 +110           Unmeasured Urine Occurrence  1 x           Invasive Devices     Peripheral Intravenous Line            Peripheral IV 07/29/20 Right Antecubital 1 day          Epidural Line            Epidural Catheter 07/31/20 less than 1 day          Drain            External Urinary Catheter less than 1 day                Physical Exam:  Vitals: Blood pressure 124/58, pulse 93, temperature 98 2 °F (36 8 °C), resp  rate 16, height 5' 6" (1 676 m), weight 54 4 kg (120 lb), SpO2 95 %  ,Body mass index is 19 37 kg/m²      General appearance: alert, appears stated age, cooperative and no distress  Head: Normocephalic, without obvious abnormality, atraumatic  Eyes: EOMI, PERRL, conjugate gaze  Neck: supple, symmetrical, trachea midline and NT  Back: no kyphosis present, patient reports no back pain, patient's left-sided rib fracture pain was too severe to roll patient over to thoroughly assess back pain  Lungs: non labored breathing, patient on 1L nasal cannula  Heart: regular heart rate  Neurologic:   Mental status: Alert, oriented x3, thought content appropriate, speech is clear, following commands  Cranial nerves: grossly intact (Cranial nerves II-XII)  Sensory: normal to LT in all extremities x4, chronic numbness and tingling in bilateral feet  Motor: moving all extremities strength 5/5 throughout except for decreased ROM to RLE secondary to prior surgery  Reflexes: 2+ and symmetric, no Vivas's or clonus appreciated      Lab Results:  Results from last 7 days   Lab Units 07/30/20  1001 07/29/20  2146   WBC Thousand/uL 7 82 10 79*   HEMOGLOBIN g/dL 10 8* 13 1   HEMATOCRIT % 32 4* 39 7   PLATELETS Thousands/uL 202 283   NEUTROS PCT %  --  74   MONOS PCT %  --  7     Results from last 7 days   Lab Units 07/30/20  0549 07/29/20  2146   POTASSIUM mmol/L 4 4 4 2   CHLORIDE mmol/L 105 104   CO2 mmol/L 27 32   BUN mg/dL 13 13   CREATININE mg/dL 0 78 0 90   CALCIUM mg/dL 9 2 9 0                 No results found for: TROPONINT  ABG:No results found for: PHART, AMF9VTZ, PO2ART, QZN6JWL, W2VRUMVB, BEART, SOURCE    Imaging Studies: I have personally reviewed pertinent reports  and I have personally reviewed pertinent films in PACS    No results found  EKG, Pathology, and Other Studies: I have personally reviewed pertinent reports        VTE Pharmacologic Prophylaxis: Heparin    VTE Mechanical Prophylaxis: sequential compression device

## 2020-07-31 NOTE — PROGRESS NOTES
Progress Note - Acute Pain Service    Ladarius Lyons 80 y o  female MRN: [id number]  Unit/Bed#: [facility] 605-01 Encounter: [id number]      Assessment:   Principal Problem:    Closed fracture of multiple ribs of left side  Active Problems:    Fall    Hemopneumothorax on left    Closed wedge compression fracture of T12 vertebra (HCC)    Acute pain due to trauma    Hypertension        Call received from primary service that patient's epidural catheter was accidentally dislodged  Catheter tip intact  Minimal drainage, no erythema at insertion site  No motor impairment  Team informed to monitor closely for signs of epidural hematoma  given heparin dose administered in close proximity to time of catheter dislodgement           Joanne Rubio MD

## 2020-07-31 NOTE — QUICK NOTE
At approximately 10:00 p m  Was made aware of problem with epidural   Came to bedside and found that the catheter was completely dislodged  Spoke with Anesthesia on-call  Blue tip intact, appears to be functioning normally  Subq heparin 5000 was already administered about 9:40 p m  Will order neuro checks to monitor for any neurological change with focus on the lower extremities  Will make acute pain team aware  Will increase analgesia as needed    Will reassess in the morning for possible replacement of epidural

## 2020-07-31 NOTE — ASSESSMENT & PLAN NOTE
- T12 compression fracture  - Appreciate Neurosurgery evaluation and recommendations  Non operative management recommended  - Maintain TLSO brace whenever upright and/or out of bed, or if patient unable to tolerate secondary to multiple rib fractures can wear brace p r n  For comfort  - Await upright thoracolumbar spine x-rays per Neurosurgery recommendations  - Continue multimodal analgesic regimen   - PT and OT evaluation and treatment as indicated    - Monitor neurologic exam

## 2020-07-31 NOTE — ASSESSMENT & PLAN NOTE
S/p mechanical fall down 1 step striking her back on a piece of furniture  Patient reports taking baby aspirin daily but has roughly stopped for a week  Imaging:    CT chest abdomen pelvis 07/29/2020:Tiny left-sided hydropneumothorax  Multiple left-sided rib fractures as described above  Compression deformity of T12 vertebral body  Thoracolumbar x-ray 07/31/2020:Superior endplate compression fracture of T12 is again identified, not significant changed with approximately 30-40% height loss  Diffuse degenerative changes throughout the thoracolumbar spine  Plan:  · Continue neurological exam  · Reviewed imaging with patient and daughter at bedside  · Upright thoracolumbar x-ray stable  · TLSO brace ordered to be worn when OOB or HOB >45 degrees, if patient unable to tolerate can wear brace prn for comfort secondary to multiple left-sided rib fractures  · No neurosurgical intervention warranted at this time  · Medical management and pain control per primary team  · Mobilize patient as tolerated  · PT/OT recommending home when medically stable  · DVT PPX: SCDs and SQ heparin    Neurosurgery will sign off, patient will follow-up in 2 weeks with upright thoracolumbar spine x-ray, call with any questions or concerns

## 2020-07-31 NOTE — SOCIAL WORK
Patient here with Closed fracture of multiple ribs of left side as a result of fall  Patient has a Small left-sided hemopneumothorax  TLSO brace  Patient pending PT/OT eval  APS consulted for pain  CM will follow for final recommendations

## 2020-07-31 NOTE — PLAN OF CARE
Problem: PHYSICAL THERAPY ADULT  Goal: Performs mobility at highest level of function for planned discharge setting  See evaluation for individualized goals  Description  Treatment/Interventions: Functional transfer training, LE strengthening/ROM, Elevations, Therapeutic exercise, Endurance training, Patient/family training, Equipment eval/education, Bed mobility, Gait training, Spoke to nursing, OT  Equipment Recommended: Walker(RW)       See flowsheet documentation for full assessment, interventions and recommendations  Note:   Prognosis: Good  Problem List: Decreased strength, Decreased range of motion, Decreased endurance, Impaired balance, Decreased mobility, Pain, Orthopedic restrictions  Assessment: Pt is an 80 y o  Female presenting to [facility] 80 s/p fall down outside steps while visiting dtr (- headstrike, - LOC)  Pt was admitted with primary diagnoses of closed fracture of multiple ribs of L side, acute pain due to trauma, hemopneumothorax on L, closed wedge compression fracture of T12 vertebra and other active problems including HTN  Pt's personal factors affecting evaluation includes primary residence in [place] and steps to negotiate at home  Pt seen for high complexity PT eval due to ongoing medical management of admitting diagnosis, supplemental O2 via NC, increased reliance on more restrictive AD, decreased functional ability and activity tolerance compared to baseline, pain and fall risk  Upon evaluation, pt was resting in bed on 2L O2 with dtr present in room  Pt required Min Ax1 with bed mobility, sit to stand transfers and ambulation using cane  Pt initially resistive to TLSO brace  PT/restorative tech educated pt on benefits of wearing brace and pt became agreeable  While seated EOB, restorative tech fitted TLSO with minimal complaints from pt and supervision from PT  Pt required Mod Ax1 for toilet transfer using grab bars and RW   Of note, pt unable to reach 90 degrees flexion with R knee which she attributes to various leg lengthening surgeries performed "a long time ago"  Based on PT eval, pt's current list of problems includes decreased BLE strength and ROM, decreased endurance, impaired balance, decreased mobility and pain  PT will continue to follow pt for remainder of stay to address these impairments  At conclusion of session, pt was left up in chair with all needs within reach and dtr present  PT currently recommending D/C to dtr's house with increased support at D/C  Pt and dtr deny questions or safety concerns with recommendation  Barriers to Discharge: None     PT Discharge Recommendation: Return to previous environment with social support(D/C to dtr's home with increased caregiver support)     PT - OK to Discharge: No(Pending further ambulatory distance and stair trial)    See flowsheet documentation for full assessment

## 2020-07-31 NOTE — ASSESSMENT & PLAN NOTE
- Acute pain due to multiple traumatic injuries, present on admission  Patient with history of chronic pain and takes Celebrex and Neurontin at baseline  -APS following, recommendations appreciated  -epidural catheter inserted 7/30, accidentally fell out overnight 7/30, PO pain medication regimen restarted until APS can re-evaluate and optimize regimen as patient does not want another epidural   - anesthesia aware of catheter out, catheter tip intact, minimal drainage   - closely monitoring for epidural hematoma as SQ heparin dose administered shortly before catheter noted to be out   - Continue to monitor her pain and make adjustments as indicated

## 2020-07-31 NOTE — ASSESSMENT & PLAN NOTE
- Status post fall with the below noted injuries  - Fall precautions   - PT and OT evaluation and treatment as indicated  - [facility] evaluation and treatment as indicated  - Case management consult for disposition planning  Per Dr. Rahul Zhong, no x-ray required.

## 2020-08-01 LAB
ANION GAP SERPL CALCULATED.3IONS-SCNC: 6 MMOL/L (ref 4–13)
BASOPHILS # BLD AUTO: 0.06 THOUSANDS/ΜL (ref 0–0.1)
BASOPHILS NFR BLD AUTO: 1 % (ref 0–1)
BUN SERPL-MCNC: 17 MG/DL (ref 5–25)
CALCIUM SERPL-MCNC: 8.1 MG/DL (ref 8.3–10.1)
CHLORIDE SERPL-SCNC: 107 MMOL/L (ref 100–108)
CO2 SERPL-SCNC: 26 MMOL/L (ref 21–32)
CREAT SERPL-MCNC: 0.59 MG/DL (ref 0.6–1.3)
EOSINOPHIL # BLD AUTO: 0.31 THOUSAND/ΜL (ref 0–0.61)
EOSINOPHIL NFR BLD AUTO: 5 % (ref 0–6)
ERYTHROCYTE [DISTWIDTH] IN BLOOD BY AUTOMATED COUNT: 14.3 % (ref 11.6–15.1)
GFR SERPL CREATININE-BSD FRML MDRD: 84 ML/MIN/1.73SQ M
GLUCOSE SERPL-MCNC: 89 MG/DL (ref 65–140)
HCT VFR BLD AUTO: 32.4 % (ref 34.8–46.1)
HGB BLD-MCNC: 10.7 G/DL (ref 11.5–15.4)
IMM GRANULOCYTES # BLD AUTO: 0.01 THOUSAND/UL (ref 0–0.2)
IMM GRANULOCYTES NFR BLD AUTO: 0 % (ref 0–2)
LYMPHOCYTES # BLD AUTO: 1.21 THOUSANDS/ΜL (ref 0.6–4.47)
LYMPHOCYTES NFR BLD AUTO: 19 % (ref 14–44)
MCH RBC QN AUTO: 31.4 PG (ref 26.8–34.3)
MCHC RBC AUTO-ENTMCNC: 33 G/DL (ref 31.4–37.4)
MCV RBC AUTO: 95 FL (ref 82–98)
MONOCYTES # BLD AUTO: 0.72 THOUSAND/ΜL (ref 0.17–1.22)
MONOCYTES NFR BLD AUTO: 11 % (ref 4–12)
NEUTROPHILS # BLD AUTO: 4.1 THOUSANDS/ΜL (ref 1.85–7.62)
NEUTS SEG NFR BLD AUTO: 64 % (ref 43–75)
NRBC BLD AUTO-RTO: 0 /100 WBCS
PLATELET # BLD AUTO: 178 THOUSANDS/UL (ref 149–390)
PMV BLD AUTO: 11.2 FL (ref 8.9–12.7)
POTASSIUM SERPL-SCNC: 3.8 MMOL/L (ref 3.5–5.3)
RBC # BLD AUTO: 3.41 MILLION/UL (ref 3.81–5.12)
SODIUM SERPL-SCNC: 139 MMOL/L (ref 136–145)
WBC # BLD AUTO: 6.41 THOUSAND/UL (ref 4.31–10.16)

## 2020-08-01 PROCEDURE — 85025 COMPLETE CBC W/AUTO DIFF WBC: CPT | Performed by: PHYSICIAN ASSISTANT

## 2020-08-01 PROCEDURE — 80048 BASIC METABOLIC PNL TOTAL CA: CPT | Performed by: PHYSICIAN ASSISTANT

## 2020-08-01 PROCEDURE — 99232 SBSQ HOSP IP/OBS MODERATE 35: CPT | Performed by: SURGERY

## 2020-08-01 PROCEDURE — 94760 N-INVAS EAR/PLS OXIMETRY 1: CPT

## 2020-08-01 RX ADMIN — GABAPENTIN 100 MG: 100 CAPSULE ORAL at 17:22

## 2020-08-01 RX ADMIN — GABAPENTIN 100 MG: 100 CAPSULE ORAL at 09:04

## 2020-08-01 RX ADMIN — AMLODIPINE BESYLATE 5 MG: 5 TABLET ORAL at 09:05

## 2020-08-01 RX ADMIN — SENNOSIDES AND DOCUSATE SODIUM 1 TABLET: 8.6; 5 TABLET ORAL at 17:22

## 2020-08-01 RX ADMIN — ROPIVACAINE HYDROCHLORIDE: 5 INJECTION, SOLUTION EPIDURAL; INFILTRATION; PERINEURAL at 14:12

## 2020-08-01 RX ADMIN — HEPARIN SODIUM 5000 UNITS: 5000 INJECTION INTRAVENOUS; SUBCUTANEOUS at 16:00

## 2020-08-01 RX ADMIN — ROPIVACAINE HYDROCHLORIDE: 5 INJECTION, SOLUTION EPIDURAL; INFILTRATION; PERINEURAL at 10:36

## 2020-08-01 RX ADMIN — CELECOXIB 200 MG: 200 CAPSULE ORAL at 09:04

## 2020-08-01 RX ADMIN — OXYCODONE HYDROCHLORIDE 5 MG: 5 TABLET ORAL at 09:06

## 2020-08-01 RX ADMIN — HEPARIN SODIUM 5000 UNITS: 5000 INJECTION INTRAVENOUS; SUBCUTANEOUS at 05:57

## 2020-08-01 RX ADMIN — CELECOXIB 200 MG: 200 CAPSULE ORAL at 17:22

## 2020-08-01 RX ADMIN — LORATADINE 10 MG: 10 TABLET ORAL at 09:05

## 2020-08-01 RX ADMIN — ATORVASTATIN CALCIUM 20 MG: 20 TABLET, FILM COATED ORAL at 17:22

## 2020-08-01 RX ADMIN — ROPIVACAINE HYDROCHLORIDE: 5 INJECTION, SOLUTION EPIDURAL; INFILTRATION; PERINEURAL at 20:50

## 2020-08-01 RX ADMIN — SENNOSIDES AND DOCUSATE SODIUM 1 TABLET: 8.6; 5 TABLET ORAL at 09:05

## 2020-08-01 RX ADMIN — HEPARIN SODIUM 5000 UNITS: 5000 INJECTION INTRAVENOUS; SUBCUTANEOUS at 21:00

## 2020-08-01 NOTE — ASSESSMENT & PLAN NOTE
- Small left-sided hemopneumothorax, present on admission   - No intervention necessary at this time, and no intervention anticipated  - repeat CXR on 07/31 did not identify any persistent pneumothorax  - Management of rib fractures as noted

## 2020-08-01 NOTE — ASSESSMENT & PLAN NOTE
- Multiple left-sided rib fractures including posteriorly 7-11 and lateral 5-8 with associated small left-sided hemopneumothorax  - Continue rib fracture protocol   - APS evaluation for multimodal analgesia regimen    - Epidural catheter placed 7/30, fell out overnight 7/30, replaced 7/31  - Continue to encourage incentive spirometer use and adequate pulmonary hygiene   - PT and OT evaluation and treatment as indicated

## 2020-08-01 NOTE — PLAN OF CARE
Problem: Potential for Falls  Goal: Patient will remain free of falls  Description  INTERVENTIONS:  - Assess patient frequently for physical needs  -  Identify cognitive and physical deficits and behaviors that affect risk of falls    -  [place] fall precautions as indicated by assessment   - Educate patient/family on patient safety including physical limitations  - Instruct patient to call for assistance with activity based on assessment  - Modify environment to reduce risk of injury  - Consider OT/PT consult to assist with strengthening/mobility  Outcome: Progressing     Problem: PAIN - ADULT  Goal: Verbalizes/displays adequate comfort level or baseline comfort level  Description  Interventions:  - Encourage patient to monitor pain and request assistance  - Assess pain using appropriate pain scale  - Administer analgesics based on type and severity of pain and evaluate response  - Implement non-pharmacological measures as appropriate and evaluate response  - Consider cultural and social influences on pain and pain management  - Notify physician/advanced practitioner if interventions unsuccessful or patient reports new pain  Outcome: Progressing     Problem: INFECTION - ADULT  Goal: Absence or prevention of progression during hospitalization  Description  INTERVENTIONS:  - Assess and monitor for signs and symptoms of infection  - Monitor lab/diagnostic results  - Monitor all insertion sites, i e  indwelling lines, tubes, and drains  - Monitor endotracheal if appropriate and nasal secretions for changes in amount and color  - [place] appropriate cooling/warming therapies per order  - Administer medications as ordered  - Instruct and encourage patient and family to use good hand hygiene technique  - Identify and instruct in appropriate isolation precautions for identified infection/condition  Outcome: Progressing  Goal: Absence of fever/infection during neutropenic period  Description  INTERVENTIONS:  - Monitor WBC    Outcome: Progressing     Problem: SAFETY ADULT  Goal: Maintain or return to baseline ADL function  Description  INTERVENTIONS:  -  Assess patient's ability to carry out ADLs; assess patient's baseline for ADL function and identify physical deficits which impact ability to perform ADLs (bathing, care of mouth/teeth, toileting, grooming, dressing, etc )  - Assess/evaluate cause of self-care deficits   - Assess range of motion  - Assess patient's mobility; develop plan if impaired  - Assess patient's need for assistive devices and provide as appropriate  - Encourage maximum independence but intervene and supervise when necessary  - Involve family in performance of ADLs  - Assess for home care needs following discharge   - Consider OT consult to assist with ADL evaluation and planning for discharge  - Provide patient education as appropriate  Outcome: Progressing  Goal: Maintain or return mobility status to optimal level  Description  INTERVENTIONS:  - Assess patient's baseline mobility status (ambulation, transfers, stairs, etc )    - Identify cognitive and physical deficits and behaviors that affect mobility  - Identify mobility aids required to assist with transfers and/or ambulation (gait belt, sit-to-stand, lift, walker, cane, etc )  - [place] fall precautions as indicated by assessment  - Record patient progress and toleration of activity level on Mobility SBAR; progress patient to next Phase/Stage  - Instruct patient to call for assistance with activity based on assessment  - Consider rehabilitation consult to assist with strengthening/weightbearing, etc   Outcome: Progressing     Problem: DISCHARGE PLANNING  Goal: Discharge to home or other facility with appropriate resources  Description  INTERVENTIONS:  - Identify barriers to discharge w/patient and caregiver  - Arrange for needed discharge resources and transportation as appropriate  - Identify discharge learning needs (meds, wound care, etc )  - Arrange for interpretive services to assist at discharge as needed  - Refer to Case Management Department for coordinating discharge planning if the patient needs post-hospital services based on physician/advanced practitioner order or complex needs related to functional status, cognitive ability, or social support system  Outcome: Progressing     Problem: Knowledge Deficit  Goal: Patient/family/caregiver demonstrates understanding of disease process, treatment plan, medications, and discharge instructions  Description  Complete learning assessment and assess knowledge base  Interventions:  - Provide teaching at level of understanding  - Provide teaching via preferred learning methods  Outcome: Progressing     Problem: Prexisting or High Potential for Compromised Skin Integrity  Goal: Skin integrity is maintained or improved  Description  INTERVENTIONS:  - Identify patients at risk for skin breakdown  - Assess and monitor skin integrity  - Assess and monitor nutrition and hydration status  - Monitor labs   - Assess for incontinence   - Turn and reposition patient  - Assist with mobility/ambulation  - Relieve pressure over bony prominences  - Avoid friction and shearing  - Provide appropriate hygiene as needed including keeping skin clean and dry  - Evaluate need for skin moisturizer/barrier cream  - Collaborate with interdisciplinary team   - Patient/family teaching  - Consider wound care consult   Outcome: Progressing     Problem: Nutrition/Hydration-ADULT  Goal: Nutrient/Hydration intake appropriate for improving, restoring or maintaining nutritional needs  Description  Monitor and assess patient's nutrition/hydration status for malnutrition  Collaborate with interdisciplinary team and initiate plan and interventions as ordered  Monitor patient's weight and dietary intake as ordered or per policy  Utilize nutrition screening tool and intervene as necessary   Determine patient's food preferences and provide high-protein, high-caloric foods as appropriate       INTERVENTIONS:  - Monitor oral intake, urinary output, labs, and treatment plans  - Assess nutrition and hydration status and recommend course of action  - Evaluate amount of meals eaten  - Assist patient with eating if necessary   - Allow adequate time for meals  - Recommend/ encourage appropriate diets, oral nutritional supplements, and vitamin/mineral supplements  - Order, calculate, and assess calorie counts as needed  - Recommend, monitor, and adjust tube feedings and TPN/PPN based on assessed needs  - Assess need for intravenous fluids  - Provide specific nutrition/hydration education as appropriate  - Include patient/family/caregiver in decisions related to nutrition  Outcome: Progressing

## 2020-08-01 NOTE — ASSESSMENT & PLAN NOTE
- Acute pain due to multiple traumatic injuries, present on admission  Patient with history of chronic pain and takes Celebrex and Neurontin at baseline    -APS following, recommendations appreciated  -epidural catheter inserted 7/30, accidentally fell out overnight 7/30, reinserted 7/31  -oral pain regimen including gabapentin, oxycodone  -patient also has IV Dilaudid ordered for breakthrough pain

## 2020-08-01 NOTE — PROGRESS NOTES
Epidural Follow-up Note - Acute Pain Service    Marcelo Garcia 80 y o  female MRN: [id number]  Unit/Bed#: [facility] 605-01 Encounter: [id number]      Assessment:   Principal Problem:    Closed fracture of multiple ribs of left side  Active Problems:    Fall    Hemopneumothorax on left    Closed wedge compression fracture of T12 vertebra (HCC)    Acute pain due to trauma    Hypertension      Marcelo Garcia is a 80y o  year old female with multiple left sided rib fractures with thoracic epidural in place, Day 1, s/p replacement due to first being dislodge while on [facility]  Plan:  Analgesia:  · Continue thoracic epidural infusion of Ropivacaine 0 1% to 6ml/hr continuous with 4ml demand dose q10min  ? -was planning to increase however pt noted to have LE weakness when increase to 8ml/hr continuous so plan to continue at initial settings  · Celebrex 200 mg twice a day, home medication  ? Okay to continue with epidural  · Gabapentin 100 mg twice a day  · Claritin 10 mg daily for itching associated with epidural  ? Okay to use Benadryl 12 5 mg IV every 6 hours as needed for itching   · Oxycodone 2 5 mg every 4 hours as needed for moderate pain  · Oxycodone 5 mg every 4 hours as needed for severe pain  · Dilaudid 0 2 mg IV every 4 hours as needed for breakthrough pain    Bowel Regimen:  - Senna 1 tablet PO qhs  - Polyethylene glycol (Miralax) 17g PO once daily PRN    APS will continue to follow  Please call  / [phone] or [facility] Acute Pain Service - B (/ between [phone] and on weekends) with questions or concerns    Pain History  Current pain location(s): left chest wall  Pain Scale:   Ranges from 0-7 out of 10  Quality: sharp, spasm  24 hour history: Patient seen and examined  Doing better since epidural replacement, still having pain  Epidural not able to help with compression fracture pain, discussed with patient and has relief with oral opioids    Running plain local epidural bag with improvement in itching  Denies nausea/LE weakness  No other complaints  Will plan to increase epidural for better coverage  PCEA use:  Opioid requirement previous 24 hours: oxycodone 10mg, dilaudid 0 2mg IV    Meds/Allergies   all current active meds have been reviewed    No Known Allergies    Objective     Temp:  [97 7 °F (36 5 °C)-98 8 °F (37 1 °C)] 98 8 °F (37 1 °C)  HR:  [] 87  Resp:  [16-18] 18  BP: ()/(55-77) 128/72    Physical Exam   Constitutional: She is oriented to person, place, and time  She appears well-developed and well-nourished  Cardiovascular: Normal rate  Pulmonary/Chest: Effort normal    Musculoskeletal: She exhibits no edema  Neurological: She is alert and oriented to person, place, and time  Analgesic level bilateral however slightly lower than level of rib fractures   Skin: Skin is warm and dry  Epidural site c/d/i   Psychiatric: She has a normal mood and affect  Her behavior is normal    Nursing note and vitals reviewed  Epidural: Site clean/dry/intact, no surrounding erythema/edema/induration, infusion functioning appropriately    Lab Results:   Results from last 7 days   Lab Units 08/01/20  0444   WBC Thousand/uL 6 41   HEMOGLOBIN g/dL 10 7*   HEMATOCRIT % 32 4*   PLATELETS Thousands/uL 178      Results from last 7 days   Lab Units 08/01/20  0444   POTASSIUM mmol/L 3 8   CHLORIDE mmol/L 107   CO2 mmol/L 26   BUN mg/dL 17   CREATININE mg/dL 0 59*   CALCIUM mg/dL 8 1*          Imaging Studies: I have personally reviewed pertinent reports  EKG, Pathology, and Other Studies: I have personally reviewed pertinent reports  Counseling / Coordination of Care  Total floor / unit time spent today 15 minutes  Greater than 50% of total time was spent with the patient and / or family counseling and / or coordination of care   A description of the counseling / coordination of care: epidural management    Please note that the APS provides consultative services regarding pain management only  With the exception of ketamine, peripheral nerve catheters, and epidural infusions (and except when indicated), final decisions regarding starting or changing doses of analgesic medications are at the discretion of the consulting service  Off hours consultation and/or medication management is generally not available      Prince River MD  Acute Pain Service

## 2020-08-01 NOTE — PROGRESS NOTES
Progress Note - David Mccurdy 1935, 80 y o  female MRN: [id number]    Unit/Bed#: [facility] 605-01 Encounter: [id number]    Primary Care Provider: Dotty Townsend   Date and time admitted to hospital: 7/29/2020  8:57 PM        Hypertension  Assessment & Plan  - Chronic history hypertension   - Continue home medication regimen including Norvasc and metoprolol   - Outpatient follow-up with primary care provider  Acute pain due to trauma  Assessment & Plan  - Acute pain due to multiple traumatic injuries, present on admission  Patient with history of chronic pain and takes Celebrex and Neurontin at baseline  -APS following, recommendations appreciated  -epidural catheter inserted 7/30, accidentally fell out overnight 7/30, reinserted 7/31  -oral pain regimen including gabapentin, oxycodone  -patient also has IV Dilaudid ordered for breakthrough pain    Closed wedge compression fracture of T12 vertebra (HCC)  Assessment & Plan  - T12 compression fracture  - Appreciate Neurosurgery evaluation and recommendations  Non operative management recommended  - Maintain TLSO brace whenever upright and/or out of bed, or if patient unable to tolerate secondary to multiple rib fractures can wear brace p r n  For comfort  - Await upright thoracolumbar spine x-rays per Neurosurgery recommendations  - Continue multimodal analgesic regimen   - PT and OT evaluation and treatment as indicated  - Monitor neurologic exam     Hemopneumothorax on left  Assessment & Plan  - Small left-sided hemopneumothorax, present on admission   - No intervention necessary at this time, and no intervention anticipated  - repeat CXR on 07/31 did not identify any persistent pneumothorax  - Management of rib fractures as noted  Fall  Assessment & Plan  - Status post fall with the below noted injuries  - Fall precautions   - PT and OT evaluation and treatment as indicated    - Arya Lockhart  Medicine evaluation and treatment as indicated  - Case management consult for disposition planning  * Closed fracture of multiple ribs of left side  Assessment & Plan  - Multiple left-sided rib fractures including posteriorly 7-11 and lateral 5-8 with associated small left-sided hemopneumothorax  - Continue rib fracture protocol   - APS evaluation for multimodal analgesia regimen    - Epidural catheter placed 7/30, fell out overnight 7/30, replaced 7/31  - Continue to encourage incentive spirometer use and adequate pulmonary hygiene   - PT and OT evaluation and treatment as indicated  Disposition:  Patient currently being managed for acute rib fractures, has epidural in place as well as multimodal pain regimen  Awaiting repeat x-rays per neuro surgery for lumbar spinal fracture  SUBJECTIVE:  Chief Complaint:  Back pain    Subjective: There were no complaints overnight, and the patient rested comfortably along no acute events transpired  Vital signs have been stable  Patient notes no new complaints this morning        OBJECTIVE:     Meds/Allergies     Current Facility-Administered Medications:     acetaminophen (TYLENOL) tablet 650 mg, 650 mg, Oral, Q6H PRN, Iris Hicks MD    amLODIPine (NORVASC) tablet 5 mg, 5 mg, Oral, Daily, Annette Cueva MD, 5 mg at 07/31/20 1562    atorvastatin (LIPITOR) tablet 20 mg, 20 mg, Oral, After Phani Snow MD, 20 mg at 07/31/20 1722    celecoxib (CeleBREX) capsule 200 mg, 200 mg, Oral, BID, Priyanka Cueva MD, 200 mg at 07/31/20 1721    diphenhydrAMINE (BENADRYL) injection 12 5 mg, 12 5 mg, Intravenous, Q6H PRN, Priyanka Cueva MD, 12 5 mg at 07/31/20 2343    gabapentin (NEURONTIN) capsule 100 mg, 100 mg, Oral, BID, Neena Cherry PA-C, 100 mg at 07/31/20 1722    heparin (porcine) subcutaneous injection 5,000 Units, 5,000 Units, Subcutaneous, Q8H [facility], Giuseppe Purdy PA-C, 5,000 Units at 08/01/20 0557    HYDROmorphone (DILAUDID) injection 0 2 mg, 0 2 mg, Intravenous, Q4H PRN, Malissa Acosta MD, 0 2 mg at 07/31/20 1325    lactated ringers bolus 500 mL, 500 mL, Intravenous, Once PRN, Malissa Acosta MD    loratadine (CLARITIN) tablet 10 mg, 10 mg, Oral, Daily, Anna Bledsoe PA-C, 10 mg at 07/31/20 1214    oxyCODONE (ROXICODONE) IR tablet 2 5 mg, 2 5 mg, Oral, Q4H PRN, Johanne Sethi MD, 2 5 mg at 07/31/20 2185    oxyCODONE (ROXICODONE) IR tablet 5 mg, 5 mg, Oral, Q4H PRN, Johanne Sethi MD, 5 mg at 07/31/20 2339    polyethylene glycol (MIRALAX) packet 17 g, 17 g, Oral, Daily PRN, Dotty Quinonez PA-C    ropivacaine 0 1 % PCEA, , Epidural, Continuous, Malissa Acosta MD    senna-docusate sodium (SENOKOT S) 8 6-50 mg per tablet 1 tablet, 1 tablet, Oral, BID, Dotty Quinonez PA-C, 1 tablet at 07/31/20 1722     Vitals:   Vitals:    08/01/20 0331   BP: 150/77   Pulse: 94   Resp: 18   Temp: 98 8 °F (37 1 °C)   SpO2: 97%       Intake/Output:  I/O       07/30 0701 - 07/31 0700 07/31 0701 - 08/01 0700    P  O  240 210    I V  (mL/kg) 544 (10) 17 4 (0 3)    Total Intake(mL/kg) 784 (14 4) 227 4 (4 2)    Urine (mL/kg/hr) 723 (0 6) 100 (0 1)    Total Output 723 100    Net +61 +127 4          Unmeasured Urine Occurrence 1 x            Nutrition/GI Proph/Bowel Reg:  Regular diet, bowel regimen with Senokot    Physical Exam:   GENERAL APPEARANCE:  Well-developed, elderly female, slightly frail, no acute distress  NEURO:  Alert, oriented, GCS 15, no focal neurologic deficits appreciated  HEENT:  Normocephalic, atraumatic, mucous membranes moist, PERRL, EOMI  CV:  Regular rate and rhythm, normal heart sounds, no murmurs, intact distal radial pulses  LUNGS:  Appropriate oxygen saturation, no cyanosis, normal respiratory effort without accessory muscle usage, lungs clear to auscultation bilaterally  GI:  Abdomen is soft, nondistended, and is nontender  :  Deferred  MSK:  Extremities appear symmetric without obvious traumatic deformity, strength is preserved in all extremities and symmetric bilaterally  SKIN:  Skin is warm and dry, some cutaneous ecchymoses appear chronic and stable    Invasive Devices     Peripheral Intravenous Line            Peripheral IV 07/31/20 Left Antecubital 1 day          Epidural Line            Epidural Catheter 07/31/20 less than 1 day          Drain            External Urinary Catheter 1 day                 Lab Results:   BMP/CMP:   Lab Results   Component Value Date    SODIUM 139 08/01/2020    K 3 8 08/01/2020     08/01/2020    CO2 26 08/01/2020    BUN 17 08/01/2020    CREATININE 0 59 (L) 08/01/2020    CALCIUM 8 1 (L) 08/01/2020    EGFR 84 08/01/2020   , CBC: No results found for: WBC, HGB, HCT, MCV, PLT, ADJUSTEDWBC, MCH, MCHC, RDW, MPV, NRBC and Coagulation: No results found for: PT, INR, APTT  Imaging/EKG Studies: Results: I have personally reviewed pertinent reports  and I have personally reviewed pertinent films in PACS   Xr Chest Pa & Lateral (24 Hours After Admission)    Result Date: 7/31/2020  Impression: Left basal consolidation could relate to effusion, atelectasis and/or pneumonia  Displaced left rib fractures are again identified as seen on recent CT  No obvious pneumothorax, tiny pneumothorax would be better evaluated with CT  No mediastinal shift  Compression fracture of T12 vertebral body as seen on CT  Workstation performed: [id number]     Xr Spine Thoracolumbar 2 Vw    Result Date: 7/31/2020  Impression: Superior endplate compression fracture of T12 is again identified, not significant changed with approximately 30-40% height loss    Questionable fracture line through the L4 vertebral body was not seen on prior CT, follow-up lumbar spine CT recommended for further evaluation  Diffuse degenerative changes throughout the thoracolumbar spine   Workstation performed: [id number]     Other Studies:  Repeat x-rays marked as ordered in chart  VTE Prophylaxis: Heparin

## 2020-08-02 PROCEDURE — 99232 SBSQ HOSP IP/OBS MODERATE 35: CPT | Performed by: SURGERY

## 2020-08-02 PROCEDURE — 99232 SBSQ HOSP IP/OBS MODERATE 35: CPT | Performed by: ANESTHESIOLOGY

## 2020-08-02 PROCEDURE — 93005 ELECTROCARDIOGRAM TRACING: CPT

## 2020-08-02 PROCEDURE — 97116 GAIT TRAINING THERAPY: CPT

## 2020-08-02 PROCEDURE — 97530 THERAPEUTIC ACTIVITIES: CPT

## 2020-08-02 RX ORDER — LANOLIN ALCOHOL/MO/W.PET/CERES
3 CREAM (GRAM) TOPICAL
Status: DISCONTINUED | OUTPATIENT
Start: 2020-08-02 | End: 2020-08-05 | Stop reason: HOSPADM

## 2020-08-02 RX ADMIN — HEPARIN SODIUM 5000 UNITS: 5000 INJECTION INTRAVENOUS; SUBCUTANEOUS at 22:24

## 2020-08-02 RX ADMIN — MELATONIN 3 MG: at 22:24

## 2020-08-02 RX ADMIN — CELECOXIB 200 MG: 200 CAPSULE ORAL at 18:00

## 2020-08-02 RX ADMIN — CELECOXIB 200 MG: 200 CAPSULE ORAL at 08:22

## 2020-08-02 RX ADMIN — AMLODIPINE BESYLATE 5 MG: 5 TABLET ORAL at 08:22

## 2020-08-02 RX ADMIN — OXYCODONE HYDROCHLORIDE 5 MG: 5 TABLET ORAL at 03:05

## 2020-08-02 RX ADMIN — GABAPENTIN 100 MG: 100 CAPSULE ORAL at 08:22

## 2020-08-02 RX ADMIN — ROPIVACAINE HYDROCHLORIDE: 5 INJECTION, SOLUTION EPIDURAL; INFILTRATION; PERINEURAL at 11:20

## 2020-08-02 RX ADMIN — OXYCODONE HYDROCHLORIDE 5 MG: 5 TABLET ORAL at 20:35

## 2020-08-02 RX ADMIN — HEPARIN SODIUM 5000 UNITS: 5000 INJECTION INTRAVENOUS; SUBCUTANEOUS at 13:33

## 2020-08-02 RX ADMIN — SENNOSIDES AND DOCUSATE SODIUM 1 TABLET: 8.6; 5 TABLET ORAL at 08:23

## 2020-08-02 RX ADMIN — LORATADINE 10 MG: 10 TABLET ORAL at 08:22

## 2020-08-02 RX ADMIN — HYDROMORPHONE HYDROCHLORIDE 0.2 MG: 1 INJECTION, SOLUTION INTRAMUSCULAR; INTRAVENOUS; SUBCUTANEOUS at 21:01

## 2020-08-02 RX ADMIN — ATORVASTATIN CALCIUM 20 MG: 20 TABLET, FILM COATED ORAL at 18:00

## 2020-08-02 RX ADMIN — ROPIVACAINE HYDROCHLORIDE: 5 INJECTION, SOLUTION EPIDURAL; INFILTRATION; PERINEURAL at 19:12

## 2020-08-02 RX ADMIN — HEPARIN SODIUM 5000 UNITS: 5000 INJECTION INTRAVENOUS; SUBCUTANEOUS at 05:13

## 2020-08-02 RX ADMIN — ROPIVACAINE HYDROCHLORIDE: 5 INJECTION, SOLUTION EPIDURAL; INFILTRATION; PERINEURAL at 04:06

## 2020-08-02 RX ADMIN — DIPHENHYDRAMINE HYDROCHLORIDE 12.5 MG: 50 INJECTION, SOLUTION INTRAMUSCULAR; INTRAVENOUS at 01:30

## 2020-08-02 RX ADMIN — SENNOSIDES AND DOCUSATE SODIUM 1 TABLET: 8.6; 5 TABLET ORAL at 18:00

## 2020-08-02 NOTE — PROGRESS NOTES
Epidural Follow-up Note - Acute Pain Service    Keaton Meneses 80 y o  female MRN: [id number]  Unit/Bed#: [facility] 605-01 Encounter: [id number]      Assessment:   Principal Problem:    Closed fracture of multiple ribs of left side  Active Problems:    Fall    Hemopneumothorax on left    Closed wedge compression fracture of T12 vertebra (HCC)    Acute pain due to trauma    Hypertension      Keaton Meneses is a 80y o  year old female with multiple left-sided rib fractures  Plan:  Analgesia:  - Continue Thoracic epidural infusion of Ropivacaine 0 1% with a rate of 6 mL/hour; PCEA dose 4 mL with a lockout of 10 minutes, maximum of 4 doses per hour   - platelet count 981  - DVT prophylaxis SQ heparin, last dose today at 0513    - Anticipate epidural removal and transition to primarily PO regimen in the next 24 hours  Hold morning dose of heparin in anticipation of epidural removal tomorrow  Multimodal analgesia  · Celebrex 200 mg twice a day, home medication  · Okay to continue with epidural  · Discontinue gabapentin due to nighttime confusion  · Claritin 10 mg daily for itching associated with epidural  · Okay to use Benadryl 12 5 mg IV every 6 hours as needed for itching  · Oxycodone 2 5 mg every 4 hours as needed for moderate pain  · Oxycodone 5 mg every 4 hours as needed for severe pain  · Dilaudid 0 2 mg IV every 4 hours as needed for breakthrough pain    Bowel Regimen:  · Senokot S 1 tablet twice a day  · MiraLax daily as needed    Treatment recommendations discussed with primary care service and anesthesiologist     Eva Lopez will continue to follow  Please call  / [phone] or [facility] Acute Pain Service - [facility] (/ between [phone] and on weekends) with questions or concerns    Pain History  Current pain location(s): left chest wall  Pain Scale:   0-7  24 hour history:  Patient resting in bed, appears comfortable  Epidural infusing without complications    Patient denied chest wall pain at present time   Pain increases with ambulation and coughing  Overnight received oxycodone 5 mg with good relief after exacerbation and chest wall pain  Continues to report mild pruritus, improves with Benadryl  States last 2 night she was confused but really oriented easily and remembers the events  Patient is awake, alert and oriented at present time  Yesterday epidural rate was increased with left lower extremity weakness, this resolved with reduction in epidural rate  Opioid requirement previous 24 hours: Oxycodone 5mg     Meds/Allergies   all current active meds have been reviewed and current meds:   Current Facility-Administered Medications   Medication Dose Route Frequency    acetaminophen (TYLENOL) tablet 650 mg  650 mg Oral Q6H PRN    amLODIPine (NORVASC) tablet 5 mg  5 mg Oral Daily    atorvastatin (LIPITOR) tablet 20 mg  20 mg Oral After Dinner    celecoxib (CeleBREX) capsule 200 mg  200 mg Oral BID    diphenhydrAMINE (BENADRYL) injection 12 5 mg  12 5 mg Intravenous Q6H PRN    gabapentin (NEURONTIN) capsule 100 mg  100 mg Oral BID    heparin (porcine) subcutaneous injection 5,000 Units  5,000 Units Subcutaneous Q8H [facility]    HYDROmorphone (DILAUDID) injection 0 2 mg  0 2 mg Intravenous Q4H PRN    lactated ringers bolus 500 mL  500 mL Intravenous Once PRN    loratadine (CLARITIN) tablet 10 mg  10 mg Oral Daily    oxyCODONE (ROXICODONE) IR tablet 2 5 mg  2 5 mg Oral Q4H PRN    oxyCODONE (ROXICODONE) IR tablet 5 mg  5 mg Oral Q4H PRN    polyethylene glycol (MIRALAX) packet 17 g  17 g Oral Daily PRN    ropivacaine 0 1 % PCEA   Epidural Continuous    senna-docusate sodium (SENOKOT S) 8 6-50 mg per tablet 1 tablet  1 tablet Oral BID       No Known Allergies    Objective     Temp:  [97 7 °F (36 5 °C)-100 9 °F (38 3 °C)] 98 6 °F (37 °C)  HR:  [] 98  Resp:  [18-20] 20  BP: ()/(48-79) 111/48    Physical Exam  Vitals signs reviewed  Constitutional:       General: She is active   She is not in acute distress  Appearance: She is not toxic-appearing or diaphoretic  HENT:      Head: Normocephalic and atraumatic  Eyes:      Extraocular Movements: EOM normal    Neck:      Musculoskeletal: Normal range of motion  Pulmonary:      Effort: Pulmonary effort is normal  No respiratory distress  Musculoskeletal:         General: No edema  Skin:     General: Skin is warm and dry  Findings: Erythema (surrounding tape on back) present  Neurological:      Mental Status: She is alert and oriented to person, place, and time  Sensory: No sensory deficit  Deep Tendon Reflexes: Strength normal    Psychiatric:         Mood and Affect: Mood and affect normal          Behavior: Behavior normal  Behavior is cooperative  Epidural: Site clean/dry/intact, no surrounding erythema/edema/induration, infusion functioning appropriately site well secured to right shoulder  Lab Results:   Results from last 7 days   Lab Units 08/01/20  0444   WBC Thousand/uL 6 41   HEMOGLOBIN g/dL 10 7*   HEMATOCRIT % 32 4*   PLATELETS Thousands/uL 178      Results from last 7 days   Lab Units 08/01/20  0444   POTASSIUM mmol/L 3 8   CHLORIDE mmol/L 107   CO2 mmol/L 26   BUN mg/dL 17   CREATININE mg/dL 0 59*   CALCIUM mg/dL 8 1*            Counseling / Coordination of Care  Total floor / unit time spent today 20 minutes  Greater than 50% of total time was spent with the patient and / or family counseling and / or coordination of care  A description of the counseling / coordination of care:  Plan of care and medications including epidural infusion reviewed with patient, RN staff, anesthesiologist and Primary Care Service    Please note that the APS provides consultative services regarding pain management only    With the exception of ketamine, peripheral nerve catheters, and epidural infusions (and except when indicated), final decisions regarding starting or changing doses of analgesic medications are at the discretion of the consulting service  Off hours consultation and/or medication management is generally not available      Ignatius Boas, CRNP  Acute Pain Service

## 2020-08-02 NOTE — PROGRESS NOTES
Prep Survey      Responses   Facility patient discharged from?  [facility]   Is patient eligible?  Yes   Discharge diagnosis  Elevated troponin, acute on chronic heart failure   Does the patient have one of the following disease processes/diagnoses(primary or secondary)?  CHF   Does the patient have Home health ordered?  Yes   What is the Home health agency?   [facility]   Is there a DME ordered?  No   Comments regarding appointments  See AVS   Prep survey completed?  Yes          Myla Manley RN         Progress Note - Bronson Timmons 1935, 80 y o  female MRN: [id number]    Unit/Bed#: [facility] 605-01 Encounter: [id number]    Primary Care Provider: Sara Betancur   Date and time admitted to hospital: 7/29/2020  8:57 PM    Closed wedge compression fracture of T12 vertebra ([address] )  Assessment & Plan  - T12 compression fracture  - Appreciate Neurosurgery evaluation and recommendations  Non operative management recommended  - Maintain TLSO brace whenever upright and/or out of bed, or if patient unable to tolerate secondary to multiple rib fractures can wear brace p r n  For comfort  - Await upright thoracolumbar spine x-rays per Neurosurgery recommendations  - Continue multimodal analgesic regimen   - PT and OT evaluation and treatment as indicated  - Monitor neurologic exam     Hemopneumothorax on left  Assessment & Plan  - Small left-sided hemopneumothorax, present on admission   - No intervention necessary at this time, and no intervention anticipated  - repeat CXR on 07/31 did not identify any persistent pneumothorax  - Management of rib fractures as noted  * Closed fracture of multiple ribs of left side  Assessment & Plan  - Multiple left-sided rib fractures including posteriorly 7-11 and lateral 5-8 with associated small left-sided hemopneumothorax  - Continue rib fracture protocol   - APS evaluation for multimodal analgesia regimen    - Epidural catheter placed 7/30, fell out overnight 7/30, replaced 7/31  - doing much better with IS; 2000 at the bedside this morning   - PT and OT evaluation and treatment as indicated  Subjective:  -she has no new complaints today; pain control said to be much better   -stable vital signs in room air and intermittent oxygen @ 2L/min via nasal cannula  -doing IS at 2000        OBJECTIVE:     Meds/Allergies     Current Facility-Administered Medications:     acetaminophen (TYLENOL) tablet 650 mg, 650 mg, Oral, Q6H PRN, Avel Bumpers, MD    amLODIPine [facility]) tablet 5 mg, 5 mg, Oral, Daily, Dev Cueva MD, 5 mg at 08/02/20 9684    atorvastatin (LIPITOR) tablet 20 mg, 20 mg, Oral, After Bharat Martinez MD, 20 mg at 08/01/20 1722    celecoxib (CeleBREX) capsule 200 mg, 200 mg, Oral, BID, Dev Cueva MD, 200 mg at 08/02/20 0822    diphenhydrAMINE (BENADRYL) injection 12 5 mg, 12 5 mg, Intravenous, Q6H PRN, Dev Cueva MD, 12 5 mg at 08/02/20 0130    gabapentin (NEURONTIN) capsule 100 mg, 100 mg, Oral, BID, Kenneth Ann PA-C, 100 mg at 08/02/20 1419    heparin (porcine) subcutaneous injection 5,000 Units, 5,000 Units, Subcutaneous, Q8H [address], Antonia Mcdonald PA-C, 5,000 Units at 08/02/20 3987    HYDROmorphone (DILAUDID) injection 0 2 mg, 0 2 mg, Intravenous, Q4H PRN, Cleo Eller MD, 0 2 mg at 07/31/20 1325    lactated ringers bolus 500 mL, 500 mL, Intravenous, Once PRN, Cleo Eller MD    loratadine (CLARITIN) tablet 10 mg, 10 mg, Oral, Daily, Luis Carney PA-C, 10 mg at 08/02/20 2213    oxyCODONE (ROXICODONE) IR tablet 2 5 mg, 2 5 mg, Oral, Q4H PRN, Cherry Chavez MD, 2 5 mg at 07/31/20 6251    oxyCODONE (ROXICODONE) IR tablet 5 mg, 5 mg, Oral, Q4H PRN, Cherry Chavez MD, 5 mg at 08/02/20 0305    polyethylene glycol (MIRALAX) packet 17 g, 17 g, Oral, Daily PRN, Antonia Mcdonald PA-C    ropivacaine 0 1 % PCEA, , Epidural, Continuous, Cleo Eller MD    senna-docusate sodium (SENOKOT S) 8 6-50 mg per tablet 1 tablet, 1 tablet, Oral, BID, Antonia Mcdonald PA-C, 1 tablet at 08/02/20 0355     Vitals:   Vitals:    08/02/20 0750   BP: (!) 111/48   Pulse: 98   Resp: 20   Temp: 98 6 °F (37 °C)   SpO2: 97%       Intake/Output:  I/O       07/31 0701 - 08/01 0700 08/01 0701 - 08/02 0700 08/02 0701 - 08/03 0700    P  O  210 425     I V  (mL/kg) 86 5 (1 6) 182 1 (3 3) 42 3 (0 8)    Total Intake(mL/kg) 296 5 (5 4) 607 1 (11 2) 42 3 (0 8)    Urine (mL/kg/hr) 100 (0 1)  250 (1 9)    Total Output 100  250    Net +196 5 +607 1 -207 8           Unmeasured Urine Occurrence  1 x            Physical Exam:   GENERAL APPEARANCE: lying comfortably in bed, not pale, not dehydrated, not febrile  NEURO: AO x3  HEENT: normocephalic, atraumatic  CV: HR-98 bpm; regular  S1/S2 only  LUNGS: slightly reduced air entry at the lung bases bilaterally  GI: soft, ND/NT  MSK: moves all extremities  SKIN: warm    Invasive Devices     Peripheral Intravenous Line            Peripheral IV 07/31/20 Left Antecubital 2 days          Epidural Line            Epidural Catheter 07/31/20 1 day          Drain            External Urinary Catheter 2 days                 Lab Results: Results: I have personally reviewed pertinent reports  Imaging/EKG Studies: Results: I have personally reviewed pertinent reports      Other Studies:   VTE Prophylaxis: Sequential compression device (Venodyne)  and Enoxaparin (Lovenox)

## 2020-08-02 NOTE — ASSESSMENT & PLAN NOTE
- Multiple left-sided rib fractures including posteriorly 7-11 and lateral 5-8 with associated small left-sided hemopneumothorax  - Continue rib fracture protocol   - APS evaluation for multimodal analgesia regimen    - Epidural catheter placed 7/30, fell out overnight 7/30, replaced 7/31  - doing much better with IS; 2000 at the bedside this morning   - PT and OT evaluation and treatment as indicated

## 2020-08-02 NOTE — PLAN OF CARE
Problem: PHYSICAL THERAPY ADULT  Goal: Performs mobility at highest level of function for planned discharge setting  See evaluation for individualized goals  Description: Treatment/Interventions: Functional transfer training, LE strengthening/ROM, Elevations, Therapeutic exercise, Endurance training, Patient/family training, Equipment eval/education, Bed mobility, Gait training, Spoke to nursing, OT  Equipment Recommended: Walker(RW)       See flowsheet documentation for full assessment, interventions and recommendations  Outcome: Progressing  Note: Prognosis: Good  Problem List: Decreased strength, Decreased range of motion, Decreased endurance, Impaired balance, Decreased mobility, Decreased coordination, Decreased safety awareness, Orthopedic restrictions  Assessment: Pt  is an 81 yo F who presents with closed fracture of multiple ribs of L side  Pt  Agreeable to PT session, Pt  Identified with full name and birthdate  Pt  Demonstrated increased functional independence and increased activity tolerance as evidenced above  Pt  Tolerated increased gait distances and demonstrated improved functional independence with transfers, gait and bed mobility, however demonstrated continued gait deviations with use of cane  Recommend return to use of RW at this time for balance and energy conservation to improve safety with functional mobility  Pt  Is progressing towards goals, as expeceted and will benefit from continued skilled therapy to increase functional independence and aid patient in return to PLOF with decreased burden of care  D/C recommendation is Home with family support and OPPT when medically appropriate  Barriers to Discharge: None     PT Discharge Recommendation: Home with skilled therapy(D/C to Dtr's Home with OPPT)     PT - OK to Discharge: Yes    See flowsheet documentation for full assessment

## 2020-08-02 NOTE — PHYSICAL THERAPY NOTE
PHYSICAL THERAPY Treatment NOTE    Patient Name: Corey Lawson  TGNUM'O Date: 8/2/2020 08/02/20 9092   Pain Assessment   Pain Assessment Tool 0-10   Pain Score No Pain   Restrictions/Precautions   Weight Bearing Precautions Per Order No   Braces or Orthoses TLSO  (backpack TLSO)   Other Precautions Cognitive; Bed Alarm; Chair Alarm;O2;Fall Risk;Multiple lines;Spinal precautions   General   Chart Reviewed Yes   Additional Pertinent History Pt  is an 79 yo F who presents with closed fracture of multiple ribs of L side   Family/Caregiver Present Yes  (Dtr  present throughout session)   Cognition   Overall Cognitive Status WFL   Arousal/Participation Cooperative   Attention Within functional limits   Orientation Level Oriented X4   Memory Within functional limits   Following Commands Follows one step commands with increased time or repetition   Comments Pt  was identified with full name and birthdate   Subjective   Subjective Pt  agreeable to PT session   Bed Mobility   Supine to Sit 4  Minimal assistance   Additional items Assist x 1;Bedrails; Increased time required;Verbal cues  (for sequencing and body mechanics to maintain spinal precaut)   Transfers   Sit to Stand 4  Minimal assistance   Additional items Assist x 1; Increased time required;Verbal cues  (for hand placement and sequencing)   Stand to Sit 4  Minimal assistance   Additional items Assist x 1; Impulsive;Verbal cues  (to reach back to control descent)   Ambulation/Elevation   Gait pattern Improper Weight shift;Narrow KALE; Forward Flexion;Decreased foot clearance; Inconsistent roland; Redundant gait at times; Short stride; Step to;Excessively slow   Gait Assistance 4  Minimal assist   Additional items Assist x 1;Verbal cues  (for posture and gait mechanics)   Assistive Device [facility]   Distance 100'x1   Balance   Static Sitting Fair +   Dynamic Sitting Fair   Static Standing Fair -   Dynamic Standing Poor +   Ambulatory Poor +   Endurance Deficit   Endurance Deficit Yes   Endurance Deficit Description postural and gait degradation noted with fatigue   Activity Tolerance   Activity Tolerance Patient limited by fatigue   Medical Staff Made Aware Spoke to NAVEEN    Nurse Made Aware Spoke to Francine Costa RN   Assessment   Prognosis Good   Problem List Decreased strength;Decreased range of motion;Decreased endurance; Impaired balance;Decreased mobility; Decreased coordination;Decreased safety awareness;Orthopedic restrictions   Assessment Pt  is an 81 yo F who presents with closed fracture of multiple ribs of L side  Pt  Agreeable to PT session, Pt  Identified with full name and birthdate  Pt  Demonstrated increased functional independence and increased activity tolerance as evidenced above  Pt  Tolerated increased gait distances and demonstrated improved functional independence with transfers, gait and bed mobility, however demonstrated continued gait deviations with use of cane  Recommend return to use of RW at this time for balance and energy conservation to improve safety with functional mobility  Pt  Is progressing towards goals, as expeceted and will benefit from continued skilled therapy to increase functional independence and aid patient in return to PLOF with decreased burden of care  D/C recommendation is Home with family support and OPPT when medically appropriate  Goals   Patient Goals to get home   STG Expiration Date 08/10/20   Plan   Treatment/Interventions Functional transfer training;LE strengthening/ROM; Therapeutic exercise; Endurance training;Cognitive reorientation;Patient/family training;Equipment eval/education; Bed mobility;Gait training;Spoke to nursing;Spoke to case management;OT;Family   Progress Progressing toward goals   PT Frequency   (3-6x/wk)   Recommendation   PT Discharge Recommendation Home with skilled therapy  (D/C to Dtr's Home with OPPT)   Equipment Recommended Kurt   PT - OK to Discharge Yes   Additional Comments When medically appropriate     Joe Case, PT, DPT 8/2/2020

## 2020-08-03 ENCOUNTER — TELEPHONE (OUTPATIENT)
Dept: NEUROSURGERY | Facility: CLINIC | Age: 85
End: 2020-08-03

## 2020-08-03 LAB
ATRIAL RATE: 123 BPM
ATRIAL RATE: 131 BPM
P AXIS: 44 DEGREES
PR INTERVAL: 168 MS
QRS AXIS: 15 DEGREES
QRS AXIS: 17 DEGREES
QRSD INTERVAL: 72 MS
QRSD INTERVAL: 72 MS
QT INTERVAL: 310 MS
QT INTERVAL: 322 MS
QTC INTERVAL: 435 MS
QTC INTERVAL: 443 MS
T WAVE AXIS: 24 DEGREES
T WAVE AXIS: 50 DEGREES
VENTRICULAR RATE: 110 BPM
VENTRICULAR RATE: 123 BPM

## 2020-08-03 PROCEDURE — 93010 ELECTROCARDIOGRAM REPORT: CPT | Performed by: INTERNAL MEDICINE

## 2020-08-03 PROCEDURE — 99232 SBSQ HOSP IP/OBS MODERATE 35: CPT | Performed by: EMERGENCY MEDICINE

## 2020-08-03 PROCEDURE — 99232 SBSQ HOSP IP/OBS MODERATE 35: CPT | Performed by: ANESTHESIOLOGY

## 2020-08-03 RX ORDER — GABAPENTIN 300 MG/1
300 CAPSULE ORAL 2 TIMES DAILY
COMMUNITY

## 2020-08-03 RX ORDER — TRAZODONE HYDROCHLORIDE 50 MG/1
50 TABLET ORAL
Status: DISCONTINUED | OUTPATIENT
Start: 2020-08-03 | End: 2020-08-05 | Stop reason: HOSPADM

## 2020-08-03 RX ORDER — TRAZODONE HYDROCHLORIDE 50 MG/1
50 TABLET ORAL
COMMUNITY

## 2020-08-03 RX ORDER — ACETAMINOPHEN 325 MG/1
975 TABLET ORAL EVERY 8 HOURS SCHEDULED
Status: DISCONTINUED | OUTPATIENT
Start: 2020-08-03 | End: 2020-08-05 | Stop reason: HOSPADM

## 2020-08-03 RX ORDER — CELECOXIB 100 MG/1
200 CAPSULE ORAL 2 TIMES DAILY
COMMUNITY

## 2020-08-03 RX ORDER — GABAPENTIN 100 MG/1
100 CAPSULE ORAL
Status: DISCONTINUED | OUTPATIENT
Start: 2020-08-03 | End: 2020-08-03

## 2020-08-03 RX ORDER — HEPARIN SODIUM 5000 [USP'U]/ML
5000 INJECTION, SOLUTION INTRAVENOUS; SUBCUTANEOUS EVERY 8 HOURS SCHEDULED
Status: DISCONTINUED | OUTPATIENT
Start: 2020-08-03 | End: 2020-08-05 | Stop reason: HOSPADM

## 2020-08-03 RX ORDER — METOPROLOL SUCCINATE 100 MG/1
100 TABLET, EXTENDED RELEASE ORAL DAILY
COMMUNITY

## 2020-08-03 RX ORDER — ATORVASTATIN CALCIUM 20 MG/1
20 TABLET, FILM COATED ORAL DAILY
COMMUNITY

## 2020-08-03 RX ORDER — LIDOCAINE 50 MG/G
2 PATCH TOPICAL DAILY
Status: DISCONTINUED | OUTPATIENT
Start: 2020-08-03 | End: 2020-08-05 | Stop reason: HOSPADM

## 2020-08-03 RX ORDER — AMLODIPINE BESYLATE 5 MG/1
5 TABLET ORAL DAILY
COMMUNITY

## 2020-08-03 RX ADMIN — CELECOXIB 200 MG: 200 CAPSULE ORAL at 17:25

## 2020-08-03 RX ADMIN — SENNOSIDES AND DOCUSATE SODIUM 1 TABLET: 8.6; 5 TABLET ORAL at 17:25

## 2020-08-03 RX ADMIN — HEPARIN SODIUM 5000 UNITS: 5000 INJECTION INTRAVENOUS; SUBCUTANEOUS at 22:18

## 2020-08-03 RX ADMIN — HYDROMORPHONE HYDROCHLORIDE 0.2 MG: 1 INJECTION, SOLUTION INTRAMUSCULAR; INTRAVENOUS; SUBCUTANEOUS at 04:23

## 2020-08-03 RX ADMIN — OXYCODONE HYDROCHLORIDE 5 MG: 5 TABLET ORAL at 03:01

## 2020-08-03 RX ADMIN — AMLODIPINE BESYLATE 5 MG: 5 TABLET ORAL at 07:38

## 2020-08-03 RX ADMIN — METOPROLOL TARTRATE 25 MG: 25 TABLET, FILM COATED ORAL at 22:17

## 2020-08-03 RX ADMIN — HEPARIN SODIUM 5000 UNITS: 5000 INJECTION INTRAVENOUS; SUBCUTANEOUS at 13:28

## 2020-08-03 RX ADMIN — ROPIVACAINE HYDROCHLORIDE: 5 INJECTION, SOLUTION EPIDURAL; INFILTRATION; PERINEURAL at 04:22

## 2020-08-03 RX ADMIN — LORATADINE 10 MG: 10 TABLET ORAL at 07:38

## 2020-08-03 RX ADMIN — OXYCODONE HYDROCHLORIDE 5 MG: 5 TABLET ORAL at 11:37

## 2020-08-03 RX ADMIN — LIDOCAINE 2 PATCH: 50 PATCH CUTANEOUS at 11:05

## 2020-08-03 RX ADMIN — TRAZODONE HYDROCHLORIDE 50 MG: 50 TABLET ORAL at 22:18

## 2020-08-03 RX ADMIN — HYDROMORPHONE HYDROCHLORIDE 0.2 MG: 1 INJECTION, SOLUTION INTRAMUSCULAR; INTRAVENOUS; SUBCUTANEOUS at 22:17

## 2020-08-03 RX ADMIN — ACETAMINOPHEN 975 MG: 325 TABLET, FILM COATED ORAL at 22:18

## 2020-08-03 RX ADMIN — OXYCODONE HYDROCHLORIDE 5 MG: 5 TABLET ORAL at 19:55

## 2020-08-03 RX ADMIN — HYDROMORPHONE HYDROCHLORIDE 0.2 MG: 1 INJECTION, SOLUTION INTRAMUSCULAR; INTRAVENOUS; SUBCUTANEOUS at 17:25

## 2020-08-03 RX ADMIN — ATORVASTATIN CALCIUM 20 MG: 20 TABLET, FILM COATED ORAL at 17:25

## 2020-08-03 RX ADMIN — METOPROLOL TARTRATE 25 MG: 25 TABLET, FILM COATED ORAL at 15:45

## 2020-08-03 RX ADMIN — ACETAMINOPHEN 650 MG: 325 TABLET, FILM COATED ORAL at 07:37

## 2020-08-03 RX ADMIN — SENNOSIDES AND DOCUSATE SODIUM 1 TABLET: 8.6; 5 TABLET ORAL at 07:38

## 2020-08-03 RX ADMIN — MELATONIN 3 MG: at 22:18

## 2020-08-03 RX ADMIN — OXYCODONE HYDROCHLORIDE 5 MG: 5 TABLET ORAL at 15:39

## 2020-08-03 RX ADMIN — DIPHENHYDRAMINE HYDROCHLORIDE 12.5 MG: 50 INJECTION, SOLUTION INTRAMUSCULAR; INTRAVENOUS at 00:29

## 2020-08-03 RX ADMIN — CELECOXIB 200 MG: 200 CAPSULE ORAL at 07:38

## 2020-08-03 RX ADMIN — OXYCODONE HYDROCHLORIDE 5 MG: 5 TABLET ORAL at 07:37

## 2020-08-03 NOTE — SOCIAL WORK
CM met with pt and her dtr to discuss the role of CM  Pt lives with her son in [place] but it currently visiting her dtr in [place]  Pt's home is 2 story with 1STE but pt remains on first  Pt's bathroom is on first floor  Pt was independent PTA but doesn't drive  Pt's [facility] in [place] but [facility] [address]  Pt has a walker at home but will need one here in [place]  Pt reports she hasn't had one through medicare for over 15 years  CM placed referral to [facility]  Pt has no hx of mental health or substance abuse  Pt will d/c home tomorrow  Dtr will transport    CM reviewed d/c planning process including the following: identifying help at home, patient preference for d/c planning needs, Discharge Lounge, [facility] Meds to Bed program, availability of treatment team to discuss questions or concerns patient and/or family may have regarding understanding medications and recognizing signs and symptoms once discharged  CM also encouraged patient to follow up with all recommended appointments after discharge  Patient advised of importance for patient and family to participate in managing patients medical well being

## 2020-08-03 NOTE — TELEPHONE ENCOUNTER
08/06/2020-CALLED PT LATER IN THE AFTERNOON, LEFT MESSAGE ON MACHINE CONFIRMING 08/17/2020 APT AND TO HAVE XRAY COMPLETED PRIOR TO APT  08/06/2020-CALLED PT, SHE STATES THEY SHE WILL NEED TO CHANGE APT, BUT WILL CALL OUR OFFICE BACK LATER       08/05/2020-PT Tanisha 25 08/04/2020-PT Tanisha 25 [facility] 08/17/2020 APT Candace Ballard      ----- Message from [id number] sent at 7/31/2020  5:35 PM EDT -----  Regarding: follow up   Needs a 2 week post hospital follow-up with an AP with thoracolumbar x-ray

## 2020-08-03 NOTE — ASSESSMENT & PLAN NOTE
- Acute pain due to multiple traumatic injuries, present on admission  Patient with history of chronic pain and takes Celebrex and Neurontin at baseline    -APS following, recommendations appreciated  -epidural catheter inserted 7/30, accidentally fell out overnight 7/30, reinserted 7/31  -increased pain requirements overnight 8/2 into 8/3 as above  -oral pain regimen including gabapentin, oxycodone  -patient also has IV Dilaudid ordered for breakthrough pain

## 2020-08-03 NOTE — ASSESSMENT & PLAN NOTE
- Multiple left-sided rib fractures including posteriorly 7-11 and lateral 5-8 with associated small left-sided hemopneumothorax  - Continue rib fracture protocol   - APS evaluation for multimodal analgesia regimen    - Epidural catheter placed 7/30, fell out overnight 7/30, replaced 7/31   - increased pain requirements overnight requiring breakthrough IV pain medication   - patient does not appear to be using PCA as much as is available   - day 3 of PCA and would consider keeping through today for pain control  - PT and OT evaluation and treatment as indicated

## 2020-08-03 NOTE — PROGRESS NOTES
Epidural Follow-up Note - Acute Pain Service    Josi Stout 80 y o  female MRN: [id number]  Unit/Bed#: [facility] 605-01 Encounter: [id number]      Assessment:   Principal Problem:    Closed fracture of multiple ribs of left side  Active Problems:    Fall    Hemopneumothorax on left    Closed wedge compression fracture of T12 vertebra (HCC)    Acute pain due to trauma    Hypertension      Josi Stout is a 80y o  year old female s/p fall resulting in multiple left sided rib fractures with epidural placement on 7/30 and replacement on 8/1 for pain control  Discussed removal today with patient and potential for increased pain  She is compensating quite well from a respiratory perspective and able to participate with incentive spirometry  She has minimal supplemental O2 requirement  Assuming transition to purely oral pain regimen goes as planned, patient appropriate for hospital discharge from a pain perspective  Plan:  Analgesia:  - Discontinue Thoracic epidural infusion   - heparin SQ last 8/2 approx 2200   - Last platelet count 370    - Transition to geriatric oral opioid regimen with oxycodone 2 5 mg/5 mg PO q4hrs PRN for moderate/severe pain, Dilaudid 0 2 mg IV q2hrs PRN for breakthrough pain     - Multimodal analgesia with  - Tylenol 975 mg PO q8hrs standing  - Lidocaine patches to affected areas 12 hours on, 12 hours off  - Celecoxib 200 mg PO BID    Bowel Regimen:  - Senokot 8 6-50mg PO once daily    APS will sign off at this time  Thank you for the consult   All opioids and other analgesics to be written at discretion of primary team  Please call  / [phone] or [facility] (/ between [phone] and on weekends) with questions or concerns    Epidural removal discussed with primary team and bedside nurse Kameron    Pain History  Current pain location(s): Left rib cage, back  Pain Scale:  5/10  Quality: Sore  24 hour history: Increased pain this morning require breakthrough opioids  Unclear etiology of pain  Reports pruritis  Denies N/V  Denies postural lightheadedness  PCEA use: 25/41  Opioid requirement previous 24 hours: Oxycodone 15 mg PO, Dilaudid 0 4 mg IV    Meds/Allergies   all current active meds have been reviewed    No Known Allergies    Objective     Temp:  [98 1 °F (36 7 °C)-98 6 °F (37 °C)] 98 6 °F (37 °C)  HR:  [] 87  Resp:  [18-19] 18  BP: (127-175)/(75-92) 144/76    Physical Exam  Vitals signs and nursing note reviewed  Constitutional:       General: She is not in acute distress  Appearance: She is well-developed and well-nourished  Cardiovascular:      Pulses: Pulses are palpable  Pulmonary:      Effort: Pulmonary effort is normal  No respiratory distress  Chest:      Chest wall: Tenderness present  Musculoskeletal:      Comments: Bilateral lower extremity motor intact   Neurological:      Mental Status: She is alert and oriented to person, place, and time  Psychiatric:         Mood and Affect: Mood and affect normal          Behavior: Behavior normal        Epidural: Epidural removed at bedside with tip intact, no surrounding erythema/edema/induration at insertion site    Lab Results:   Results from last 7 days   Lab Units 08/01/20  0444   WBC Thousand/uL 6 41   HEMOGLOBIN g/dL 10 7*   HEMATOCRIT % 32 4*   PLATELETS Thousands/uL 178      Results from last 7 days   Lab Units 08/01/20  0444   POTASSIUM mmol/L 3 8   CHLORIDE mmol/L 107   CO2 mmol/L 26   BUN mg/dL 17   CREATININE mg/dL 0 59*   CALCIUM mg/dL 8 1*          Imaging Studies: I have personally reviewed pertinent reports  EKG, Pathology, and Other Studies: I have personally reviewed pertinent reports  Counseling / Coordination of Care  Total floor / unit time spent today 20 minutes  Greater than 50% of total time was spent with the patient and / or family counseling and / or coordination of care  A description of the counseling / coordination of care:  Focused H&P, discussed removal of epidural and transition of analgesia, epidural removal    Please note that the APS provides consultative services regarding pain management only  With the exception of ketamine, peripheral nerve catheters, and epidural infusions (and except when indicated), final decisions regarding starting or changing doses of analgesic medications are at the discretion of the consulting service  Off hours consultation and/or medication management is generally not available      Selena Echols MD  Acute Pain Service

## 2020-08-03 NOTE — PROGRESS NOTES
Progress Note - Bret Jacobo 1935, 80 y o  female MRN: [id number]    Unit/Bed#: [facility] 605-01 Encounter: [id number]    Primary Care Provider: Guy Davies   Date and time admitted to hospital: 7/29/2020  8:57 PM        Hypertension  Assessment & Plan  - Chronic history hypertension   - Continue home medication regimen including Norvasc and metoprolol   - Outpatient follow-up with primary care provider  Acute pain due to trauma  Assessment & Plan  - Acute pain due to multiple traumatic injuries, present on admission  Patient with history of chronic pain and takes Celebrex and Neurontin at baseline  -APS following, recommendations appreciated  -epidural catheter inserted 7/30, accidentally fell out overnight 7/30, reinserted 7/31  -increased pain requirements overnight 8/2 into 8/3 as above  -oral pain regimen including gabapentin, oxycodone  -patient also has IV Dilaudid ordered for breakthrough pain    Closed wedge compression fracture of T12 vertebra (HCC)  Assessment & Plan  - T12 compression fracture  - Appreciate Neurosurgery evaluation and recommendations  Non operative management recommended  - Maintain TLSO brace whenever upright and/or out of bed, or if patient unable to tolerate secondary to multiple rib fractures can wear brace p r n  For comfort  - Await upright thoracolumbar spine x-rays per Neurosurgery recommendations  - Continue multimodal analgesic regimen   - PT and OT evaluation and treatment as indicated  - Monitor neurologic exam     Hemopneumothorax on left  Assessment & Plan  - Small left-sided hemopneumothorax, present on admission   - No intervention necessary at this time, and no intervention anticipated  - repeat CXR on 07/31 did not identify any persistent pneumothorax  - Management of rib fractures as noted  Fall  Assessment & Plan  - Status post fall with the below noted injuries  - Fall precautions   - PT and OT evaluation and treatment as indicated    - Arya Lockhart [facility] Medicine evaluation and treatment as indicated  - Case management consult for disposition planning  * Closed fracture of multiple ribs of left side  Assessment & Plan  - Multiple left-sided rib fractures including posteriorly 7-11 and lateral 5-8 with associated small left-sided hemopneumothorax  - Continue rib fracture protocol   - APS evaluation for multimodal analgesia regimen    - Epidural catheter placed 7/30, fell out overnight 7/30, replaced 7/31   - increased pain requirements overnight requiring breakthrough IV pain medication   - patient does not appear to be using PCA as much as is available   - day 3 of PCA and would consider keeping through today for pain control  - PT and OT evaluation and treatment as indicated  Disposition: pending pain control with rib fractures      SUBJECTIVE:  Chief Complaint: chest wall pain    Subjective: Increased pain overnight  Difficulty sleeping and laying in bed  Sat upright in chair most of the night   Requesting PCA to be left through today at least        OBJECTIVE:     Meds/Allergies     Current Facility-Administered Medications:     acetaminophen (TYLENOL) tablet 650 mg, 650 mg, Oral, Q6H PRN, Love Santos MD, 650 mg at 08/03/20 0737    amLODIPine (NORVASC) tablet 5 mg, 5 mg, Oral, Daily, Duane Rummer Rivard, MD, 5 mg at 08/03/20 1983    atorvastatin (LIPITOR) tablet 20 mg, 20 mg, Oral, After James Mcknight MD, 20 mg at 08/02/20 1800    celecoxib (CeleBREX) capsule 200 mg, 200 mg, Oral, BID, Duane Rummer Rivard, MD, 200 mg at 08/03/20 0738    diphenhydrAMINE (BENADRYL) injection 12 5 mg, 12 5 mg, Intravenous, Q6H PRN, Duane Rummer Rivard, MD, 12 5 mg at 08/03/20 0029    HYDROmorphone (DILAUDID) injection 0 2 mg, 0 2 mg, Intravenous, Q4H PRN, Lacie Lynn MD, 0 2 mg at 08/03/20 0423    lactated ringers bolus 500 mL, 500 mL, Intravenous, Once PRN, Lacie Lynn MD    loratadine (CLARITIN) tablet 10 mg, 10 mg, Oral, Daily, Becka Rivas TITO Ambrose, 10 mg at 08/03/20 7423    melatonin tablet 3 mg, 3 mg, Oral, HS, Ozzy Reis MD, 3 mg at 08/02/20 2224    oxyCODONE (ROXICODONE) IR tablet 2 5 mg, 2 5 mg, Oral, Q4H PRN, Katarzyna Gardy MD, 2 5 mg at 07/31/20 2116    oxyCODONE (ROXICODONE) IR tablet 5 mg, 5 mg, Oral, Q4H PRN, Katarzyna Grady MD, 5 mg at 08/03/20 0737    polyethylene glycol (MIRALAX) packet 17 g, 17 g, Oral, Daily PRN, Oliverio Medina PA-C    senna-docusate sodium (SENOKOT S) 8 6-50 mg per tablet 1 tablet, 1 tablet, Oral, BID, Olvierio Medina PA-C, 1 tablet at 08/03/20 7607     Vitals:   Vitals:    08/03/20 0700   BP: 144/76   Pulse: 87   Resp: 18   Temp: 98 6 °F (37 °C)   SpO2: 98%       Intake/Output:  I/O       08/01 0701 - 08/02 0700 08/02 0701 - 08/03 0700 08/03 0701 - 08/04 0700    P  O  425 600     I V  (mL/kg) 182 1 (3 3) 284 4 (5 2)     Total Intake(mL/kg) 607 1 (11 2) 884 4 (16 3)     Urine (mL/kg/hr)  880 (0 7)     Total Output  880     Net +607 1 +4 4            Unmeasured Urine Occurrence 1 x 2 x            Nutrition/GI Proph/Bowel Reg: senokot, miralax PRN    Physical Exam:   GENERAL APPEARANCE: NAD  NEURO: A&Ox3 to person, place, and time  Following commands on upper and lower extremities  HEENT: Normocephalic  CV: RRR  LUNGS: CTA b/l  GI: Non-tender, non-distended  : no niño  MSK: Moving all extremities spontaneously  TTP over chest wall  SKIN: no rashes   Epidural in place      Invasive Devices     Peripheral Intravenous Line            Peripheral IV 07/31/20 Left Antecubital 2 days          Epidural Line            Epidural Catheter 07/31/20 2 days          Drain            External Urinary Catheter 3 days                 Lab Results: Results: I have personally reviewed pertinent reports   , BMP/CMP: No results found for: SODIUM, K, CL, CO2, ANIONGAP, BUN, CREATININE, GLUCOSE, CALCIUM, AST, ALT, ALKPHOS, PROT, BILITOT, EGFR, CBC: No results found for: WBC, HGB, HCT, MCV, PLT, ADJUSTEDWBC, MCH, MCHC, RDW, MPV, NRBC, Coagulation: No results found for: PT, INR, APTT, Lactate: No results found for: LACTATE, Amylase: No results found for: AMYLASE, Lipase: No results found for: LIPASE, AST: No results found for: AST, ALT: No results found for: ALT, Urinalysis: No results found for: Gabrielle Comber, SPECGRAV, PHUR, LEUKOCYTESUR, NITRITE, PROTEINUA, GLUCOSEU, KETONESU, BILIRUBINUR, BLOODU, CK: No results found for: CKTOTAL, Troponin: No results found for: TROPONINI, EtOH: No results found for: ETOH, UDS: No components found for: RAPIDDRUGSCREEN, ABG: No results found for: PHART, KDT7WQX, PO2ART, OGF5OHF, E9IVTMMW, BEART, SOURCE and ISTAT: No components found for: VBG  Imaging/EKG Studies: Results: I have personally reviewed pertinent reports    No new imaging  VTE Prophylaxis: Sequential compression device (Venodyne)  and Heparin

## 2020-08-04 PROCEDURE — 97116 GAIT TRAINING THERAPY: CPT

## 2020-08-04 PROCEDURE — 97530 THERAPEUTIC ACTIVITIES: CPT

## 2020-08-04 PROCEDURE — 99232 SBSQ HOSP IP/OBS MODERATE 35: CPT | Performed by: EMERGENCY MEDICINE

## 2020-08-04 RX ORDER — METHOCARBAMOL 500 MG/1
250 TABLET, FILM COATED ORAL EVERY 8 HOURS SCHEDULED
Status: DISCONTINUED | OUTPATIENT
Start: 2020-08-04 | End: 2020-08-05 | Stop reason: HOSPADM

## 2020-08-04 RX ADMIN — TRAZODONE HYDROCHLORIDE 50 MG: 50 TABLET ORAL at 22:19

## 2020-08-04 RX ADMIN — OXYCODONE HYDROCHLORIDE 5 MG: 5 TABLET ORAL at 20:24

## 2020-08-04 RX ADMIN — HYDROMORPHONE HYDROCHLORIDE 0.2 MG: 1 INJECTION, SOLUTION INTRAMUSCULAR; INTRAVENOUS; SUBCUTANEOUS at 11:22

## 2020-08-04 RX ADMIN — CELECOXIB 200 MG: 200 CAPSULE ORAL at 19:43

## 2020-08-04 RX ADMIN — METOPROLOL TARTRATE 25 MG: 25 TABLET, FILM COATED ORAL at 20:24

## 2020-08-04 RX ADMIN — HEPARIN SODIUM 5000 UNITS: 5000 INJECTION INTRAVENOUS; SUBCUTANEOUS at 05:14

## 2020-08-04 RX ADMIN — SENNOSIDES AND DOCUSATE SODIUM 1 TABLET: 8.6; 5 TABLET ORAL at 19:44

## 2020-08-04 RX ADMIN — METHOCARBAMOL 250 MG: 500 TABLET, FILM COATED ORAL at 22:19

## 2020-08-04 RX ADMIN — METOPROLOL TARTRATE 25 MG: 25 TABLET, FILM COATED ORAL at 08:05

## 2020-08-04 RX ADMIN — ACETAMINOPHEN 975 MG: 325 TABLET, FILM COATED ORAL at 22:20

## 2020-08-04 RX ADMIN — SENNOSIDES AND DOCUSATE SODIUM 1 TABLET: 8.6; 5 TABLET ORAL at 08:05

## 2020-08-04 RX ADMIN — HEPARIN SODIUM 5000 UNITS: 5000 INJECTION INTRAVENOUS; SUBCUTANEOUS at 13:32

## 2020-08-04 RX ADMIN — ACETAMINOPHEN 975 MG: 325 TABLET, FILM COATED ORAL at 05:14

## 2020-08-04 RX ADMIN — OXYCODONE HYDROCHLORIDE 5 MG: 5 TABLET ORAL at 16:56

## 2020-08-04 RX ADMIN — OXYCODONE HYDROCHLORIDE 5 MG: 5 TABLET ORAL at 08:06

## 2020-08-04 RX ADMIN — ATORVASTATIN CALCIUM 20 MG: 20 TABLET, FILM COATED ORAL at 19:43

## 2020-08-04 RX ADMIN — LORATADINE 10 MG: 10 TABLET ORAL at 08:05

## 2020-08-04 RX ADMIN — METHOCARBAMOL 250 MG: 500 TABLET, FILM COATED ORAL at 13:32

## 2020-08-04 RX ADMIN — ACETAMINOPHEN 975 MG: 325 TABLET, FILM COATED ORAL at 13:32

## 2020-08-04 RX ADMIN — OXYCODONE HYDROCHLORIDE 2.5 MG: 5 TABLET ORAL at 22:19

## 2020-08-04 RX ADMIN — LIDOCAINE 2 PATCH: 50 PATCH CUTANEOUS at 08:06

## 2020-08-04 RX ADMIN — AMLODIPINE BESYLATE 5 MG: 5 TABLET ORAL at 08:05

## 2020-08-04 RX ADMIN — HEPARIN SODIUM 5000 UNITS: 5000 INJECTION INTRAVENOUS; SUBCUTANEOUS at 22:20

## 2020-08-04 RX ADMIN — OXYCODONE HYDROCHLORIDE 5 MG: 5 TABLET ORAL at 01:14

## 2020-08-04 RX ADMIN — CELECOXIB 200 MG: 200 CAPSULE ORAL at 08:06

## 2020-08-04 RX ADMIN — MELATONIN 3 MG: at 22:19

## 2020-08-04 RX ADMIN — HYDROMORPHONE HYDROCHLORIDE 0.2 MG: 1 INJECTION, SOLUTION INTRAMUSCULAR; INTRAVENOUS; SUBCUTANEOUS at 19:23

## 2020-08-04 NOTE — ASSESSMENT & PLAN NOTE
- Acute pain due to multiple traumatic injuries, present on admission  Patient with history of chronic pain and takes Celebrex and Neurontin at baseline   -follow up with APS today

## 2020-08-04 NOTE — APP STUDENT NOTE
Progress Note - Trauma   Corey Lawson 80 y o  female [id number]   Unit/Bed#: [facility] 605-01 Encounter: [id number]     ASSESSMENT: 81 yo female writhing in bed in pain, stating she cannot go home today because her pain is so severe and its raining outside  Pt is very tender on the left side/ flank area with a large area of ecchymosis  She also has a scrape on her mid-thoracic region  PT/OT has determined she is cleared to go home with outpatient PT  Pt states she lives in [place] [address] and is in [place] visiting her daughter  She states that she has a bedroom on the ground-level at her daughters house, and won't have any issues getting around  Patient Active Problem List   Diagnosis    Fall    Closed fracture of multiple ribs of left side    Hemopneumothorax on left    Closed wedge compression fracture of T12 vertebra ([address] )    Acute pain due to trauma    Hypertension        PLAN:  1  Closed wedge compression fx of T12 vertebra  · TLSO brace when upright and/or out of bed  · Q4hr neuro checks  · PT/OT: Home with outpatient PT  · Pain control    2  Hydropneumothorax on the left (small)  · No intervention needed  · Incentive spirometry    3  Closed fractures of multiple ribs on the left side  · Rib fracture protocol  · Incentive spirometry  · PT/OT: Home with outpatient PT  · Pain control      Disposition: Patient is cleared to go home with outpatient PT, but her pain is not controlled at this time  SUBJECTIVE:  Chief Complaint: "I am in so much pain"    Subjective: Pt complains of left sided flank pain and back pain, admits that it is worse when she lays down and better with sitting  She does not feel like the pain medications are helping her at all  Pt has associated shortness of breath with exertion  She denies headaches, neck pain, chest pain, abdominal pain, nausea, vomiting, weakness or tingling in her extremities           OBJECTIVE:     Meds/Allergies     Current Facility-Administered Medications:    acetaminophen (TYLENOL) tablet 975 mg, 975 mg, Oral, Q8H [address], SHIRLEY Wooten, 975 mg at 08/04/20 0514    amLODIPine (NORVASC) tablet 5 mg, 5 mg, Oral, Daily, Odilon Cueva MD, 5 mg at 08/04/20 0805    atorvastatin (LIPITOR) tablet 20 mg, 20 mg, Oral, After Vannessa Ponce MD, 20 mg at 08/03/20 1725    celecoxib (CeleBREX) capsule 200 mg, 200 mg, Oral, BID, Odilon Cueva MD, 200 mg at 08/04/20 0806    heparin (porcine) subcutaneous injection 5,000 Units, 5,000 Units, Subcutaneous, Q8H [address], Yessi Sanchez MD, 5,000 Units at 08/04/20 0514    HYDROmorphone (DILAUDID) injection 0 2 mg, 0 2 mg, Intravenous, Q4H PRN, Jennifer Lyles MD, 0 2 mg at 08/03/20 2217    lactated ringers bolus 500 mL, 500 mL, Intravenous, Once PRN, Jennifer Lyles MD    lidocaine (LIDODERM) 5 % patch 2 patch, 2 patch, Topical, Daily, SHIRLEY Wooten, 2 patch at 08/04/20 0806    loratadine (CLARITIN) tablet 10 mg, 10 mg, Oral, Daily, Saurav Mehta PA-C, 10 mg at 08/04/20 0805    melatonin tablet 3 mg, 3 mg, Oral, HS, Yessi Sanchez MD, 3 mg at 08/03/20 2218    metoprolol tartrate (LOPRESSOR) tablet 25 mg, 25 mg, Oral, Q12H [address], SHIRLEY Wooten, 25 mg at 08/04/20 0805    oxyCODONE (ROXICODONE) IR tablet 2 5 mg, 2 5 mg, Oral, Q4H PRN, Harjeet Domínguez MD, 2 5 mg at 07/31/20 1701    oxyCODONE (ROXICODONE) IR tablet 5 mg, 5 mg, Oral, Q4H PRN, Harjeet Domínguez MD, 5 mg at 08/04/20 0806    polyethylene glycol (MIRALAX) packet 17 g, 17 g, Oral, Daily PRN, Vandana Jameson PA-C    senna-docusate sodium (SENOKOT S) 8 6-50 mg per tablet 1 tablet, 1 tablet, Oral, BID, Vandana Jameson PA-C, 1 tablet at 08/04/20 0805    traZODone (DESYREL) tablet 50 mg, 50 mg, Oral, HS, SHIRLEY Negro, 50 mg at 08/03/20 2218     Vitals:   Vitals:    08/04/20 0758   BP: 138/87   Pulse: 89   Resp: 16   Temp: 98 2 °F (36 8 °C)   SpO2: 97%       Intake/Output:  I/O       08/02 0701 - 08/03 0700 08/03 0701 - 08/04 0700 08/04 0701 - 08/05 0700 P O  840 680 240    I V  (mL/kg) 284 4 (5 2)      Total Intake(mL/kg) 1124 4 (20 7) 680 (12 5) 240 (4 4)    Urine (mL/kg/hr) 880 (0 7) 1350 (1)     Total Output 880 1350     Net +244 4 -670 +240           Unmeasured Urine Occurrence 2 x 1 x            Physical Exam:   GENERAL APPEARANCE: Alert elderly female laying uncomfortably in bed, writhing around in pain  Mild acute distress  Was relieved with sitting up in bed  NEURO: AOx3, GCS 15, motor and sensory function intact throughout  HEENT: Normocephalic, atraumatic  CV: RRR, +S1, +S2, no murmurs or friction rubs  +2/4 radial and DP pulses bilaterally  LUNGS: CTAB, respirations unlabored  GI: BSx4, tenderness to lateral sides of LUQ, LLQ and Left flank with significant ecchymosis without guarding or rigidity  MSK: Strength intact throughout  Small abrasion on thoracic area of spine  SKIN: Pink, warm, dry      Invasive Devices     Peripheral Intravenous Line            Peripheral IV 07/31/20 Left Antecubital 3 days          Epidural Line            Epidural Catheter 07/31/20 3 days          Drain            External Urinary Catheter 4 days

## 2020-08-04 NOTE — PHYSICAL THERAPY NOTE
Physical Therapy Progress Note     08/04/20 1531   Pain Assessment   Pain Assessment Tool FLACC   Pain Rating: FLACC (Rest) - Face 1   Pain Rating: FLACC (Rest) - Legs 1   Pain Rating: FLACC (Rest) - Activity 0   Pain Rating: FLACC (Rest) - Cry 1   Pain Rating: FLACC (Rest) - Consolability 0   Score: FLACC (Rest) 3   Pain Rating: FLACC (Activity) - Face 2   Pain Rating: FLACC (Activity) - Legs 1   Pain Rating: FLACC (Activity) - Activity 1   Pain Rating: FLACC (Activity) - Cry 1   Pain Rating: FLACC (Activity) - Consolability 1   Score: FLACC (Activity) 6   Restrictions/Precautions   Braces or Orthoses TLSO  (backpack TLSO)   Other Precautions Pain; Fall Risk;Spinal precautions   Subjective   Subjective Pt encountered supine in bed with daughter present  Offers no new complaint at this time  Pain is controleld, but increases with activity  Pt reports it is difficult to get comforrtable in bed & in the chair  Bed Mobility   Rolling R 5  Supervision   Additional items Assist x 1;Bedrails   Supine to Sit 4  Minimal assistance   Additional items Assist x 1   Transfers   Sit to Stand 5  Supervision   Additional items Assist x 1; Armrests; Increased time required   Stand to Sit 5  Supervision   Additional items Assist x 1; Armrests; Increased time required   Ambulation/Elevation   Gait pattern Excessively slow; Short stride; Inconsistent roland;Decreased foot clearance; Antalgic   Gait Assistance 4  Minimal assist   Additional items Assist x 1  (+ chair follow)   Assistive Device Rolling walker   Distance 90' x 2   Balance   Static Sitting Fair +   Static Standing Fair   Ambulatory Poor +   Endurance Deficit   Endurance Deficit Yes   Endurance Deficit Description pain, fatigue, observed SOB with activity   Activity Tolerance   Activity Tolerance Patient tolerated treatment well;Patient limited by fatigue;Patient limited by pain   Nurse Made Aware ZAIN Cisse   Assessment   Prognosis Good   Problem List Decreased strength;Decreased range of motion;Decreased endurance; Impaired balance;Decreased mobility; Decreased coordination;Decreased safety awareness;Orthopedic restrictions   Assessment Pt's endurance and activity tolerance continue to be limited by pain & observed SOB this session  Pt was able to progress ambulatory distances with use of RW without LOB or increased unsteadiness  Pt required min A/supervision for all tasks at this time, including bed mobilty & donning/doffing/adjusting TLSO during session  Daughter was present & was able to assist with dressing & adjusting brace for pt's comfort when needed  Upon return to room, pt & family educated in importance of increased activity & deep breathing at this time to prevent complications related to immobilty & shallow breathing  Encouraged pt to ambulate with hospital staff outside of therapy when tolerable to maximize mobilty throughout the day  Continue with current POC and discharge plan at this time  Pt needs to trial steps prior to discharge home  Barriers to Discharge None   Goals   Patient Goals to go home & have less pain   STG Expiration Date 08/10/20   PT Treatment Day 1   Plan   Treatment/Interventions Functional transfer training;LE strengthening/ROM; Elevations; Therapeutic exercise; Endurance training;Patient/family training;Equipment eval/education; Bed mobility;Gait training   Progress Progressing toward goals   PT Frequency   (3-5x/week)   Recommendation   PT Discharge Recommendation Return to previous environment with social support;Home with skilled therapy  (OPPT when appropriate)   Equipment Recommended Walker   PT - OK to Discharge No   Additional Comments must trial steps     Christian Lee, PTA

## 2020-08-04 NOTE — PROGRESS NOTES
Progress Note - Gilford Mohair 1935, 80 y o  female MRN: [id number]    Unit/Bed#: [facility] 605-01 Encounter: [id number]    Primary Care Provider: Elpidio Lopez   Date and time admitted to hospital: 7/29/2020  8:57 PM        Hypertension  Assessment & Plan  - Chronic history hypertension   - Continue home medication regimen including Norvasc and metoprolol   - Outpatient follow-up with primary care provider  Acute pain due to trauma  Assessment & Plan  - Acute pain due to multiple traumatic injuries, present on admission  Patient with history of chronic pain and takes Celebrex and Neurontin at baseline   -follow up with APS today  Closed wedge compression fracture of T12 vertebra (HCC)  Assessment & Plan  - T12 compression fracture  - Appreciate Neurosurgery evaluation and recommendations  Non operative management recommended  - Maintain TLSO brace whenever upright and/or out of bed, or if patient unable to tolerate secondary to multiple rib fractures can wear brace p r n  For comfort  - Await upright thoracolumbar spine x-rays per Neurosurgery recommendations  - Continue multimodal analgesic regimen   - PT and OT evaluation and treatment as indicated  - Monitor neurologic exam     Hemopneumothorax on left  Assessment & Plan  - Small left-sided hemopneumothorax, present on admission   - No intervention necessary at this time, and no intervention anticipated  - repeat CXR on 07/31 did not identify any persistent pneumothorax  - Management of rib fractures as noted  Fall  Assessment & Plan  - Status post fall with the below noted injuries  - Fall precautions   - PT and OT evaluation and treatment as indicated  - [facility] evaluation and treatment as indicated  - Case management consult for disposition planning      * Closed fracture of multiple ribs of left side  Assessment & Plan  - Multiple left-sided rib fractures including posteriorly 7-11 and lateral 5-8 with associated small left-sided hemopneumothorax  - Continue rib fracture protocol   -epidural taken off yesterday; she describes somewhat less optimal pain control; will follow up with APS to further optimize her pain control   -currently doing IS at 2000; to continue IS/pulmonary toilet  Disposition: rehab (pending)      SUBJECTIVE:  -complains of more pain this since epidural was taken off   -otherwise no acute overnight events  -stable vital signs in room air   -doing IS at 2000        OBJECTIVE:     Meds/Allergies     Current Facility-Administered Medications:     acetaminophen (TYLENOL) tablet 975 mg, 975 mg, Oral, Q8H [address], SHRILEY Wooten, 975 mg at 08/04/20 0514    amLODIPine (NORVASC) tablet 5 mg, 5 mg, Oral, Daily, Duane Rummer Rivard, MD, 5 mg at 08/03/20 0738    atorvastatin (LIPITOR) tablet 20 mg, 20 mg, Oral, After James Mcknight MD, 20 mg at 08/03/20 1725    celecoxib (CeleBREX) capsule 200 mg, 200 mg, Oral, BID, Duane Rummer Rivard, MD, 200 mg at 08/03/20 1725    heparin (porcine) subcutaneous injection 5,000 Units, 5,000 Units, Subcutaneous, Q8H [address], Ozzy Reis MD, 5,000 Units at 08/04/20 0514    HYDROmorphone (DILAUDID) injection 0 2 mg, 0 2 mg, Intravenous, Q4H PRN, Lacie Lynn MD, 0 2 mg at 08/03/20 2217    lactated ringers bolus 500 mL, 500 mL, Intravenous, Once PRN, Lacie Lynn MD    lidocaine (LIDODERM) 5 % patch 2 patch, 2 patch, Topical, Daily, SHIRLEY Wooten, 2 patch at 08/03/20 1105    loratadine (CLARITIN) tablet 10 mg, 10 mg, Oral, Daily, Arnie Mckee PA-C, 10 mg at 08/03/20 0738    melatonin tablet 3 mg, 3 mg, Oral, HS, Ozzy Reis MD, 3 mg at 08/03/20 2218    metoprolol tartrate (LOPRESSOR) tablet 25 mg, 25 mg, Oral, Q12H [address], SHIRLEY Wooten, 25 mg at 08/03/20 2217    oxyCODONE (ROXICODONE) IR tablet 2 5 mg, 2 5 mg, Oral, Q4H PRN, Katarzyna Grady MD, 2 5 mg at 07/31/20 0643    oxyCODONE (ROXICODONE) IR tablet 5 mg, 5 mg, Oral, Q4H PRN, Matthew Alex MD, 5 mg at 08/04/20 0114    polyethylene glycol (MIRALAX) packet 17 g, 17 g, Oral, Daily PRN, Rosalia Azevedo PA-C    senna-docusate sodium (SENOKOT S) 8 6-50 mg per tablet 1 tablet, 1 tablet, Oral, BID, Rosalia Azevedo PA-C, 1 tablet at 08/03/20 1725    traZODone (DESYREL) tablet 50 mg, 50 mg, Oral, HS, SHIRLEY Couch, 50 mg at 08/03/20 2218     Vitals:   Vitals:    08/03/20 2244   BP: 138/86   Pulse: 93   Resp: 18   Temp: 98 8 °F (37 1 °C)   SpO2: 96%       Intake/Output:  I/O       08/02 0701 - 08/03 0700 08/03 0701 - 08/04 0700 08/04 0701 - 08/05 0700    P  O  840 680     I V  (mL/kg) 284 4 (5 2)      Total Intake(mL/kg) 1124 4 (20 7) 680 (12 5)     Urine (mL/kg/hr) 880 (0 7) 1350 (1)     Total Output 880 1350     Net +244 4 -670            Unmeasured Urine Occurrence 2 x 1 x            Nutrition/GI Proph/Bowel Reg: regular    Physical Exam:   GENERAL APPEARANCE: NAD  NEURO: A&Ox3 to person, place, and time  Following commands on upper and lower extremities  HEENT: Normocephalic  CV: RRR  LUNGS: CTA b/l  GI: Non-tender, non-distended  : no niño  MSK: Moving all extremities spontaneously  TTP over chest wall  SKIN: no rashes; warm and dry  Invasive Devices     Peripheral Intravenous Line            Peripheral IV 07/31/20 Left Antecubital 3 days          Epidural Line            Epidural Catheter 07/31/20 3 days          Drain            External Urinary Catheter 4 days                 Lab Results: Results: I have personally reviewed pertinent reports  Imaging/EKG Studies: Results: I have personally reviewed pertinent reports      Other Studies:   VTE Prophylaxis: Heparin

## 2020-08-04 NOTE — PLAN OF CARE
Problem: PHYSICAL THERAPY ADULT  Goal: Performs mobility at highest level of function for planned discharge setting  See evaluation for individualized goals  Description: Treatment/Interventions: Functional transfer training, LE strengthening/ROM, Elevations, Therapeutic exercise, Endurance training, Patient/family training, Equipment eval/education, Bed mobility, Gait training, Spoke to nursing, OT  Equipment Recommended: Walker(RW)       See flowsheet documentation for full assessment, interventions and recommendations  Outcome: Progressing  Note: Prognosis: Good  Problem List: Decreased strength, Decreased range of motion, Decreased endurance, Impaired balance, Decreased mobility, Decreased coordination, Decreased safety awareness, Orthopedic restrictions  Assessment: Pt's endurance and activity tolerance continue to be limited by pain & observed SOB this session  Pt was able to progress ambulatory distances with use of RW without LOB or increased unsteadiness  Pt required min A/supervision for all tasks at this time, including bed mobilty & donning/doffing/adjusting TLSO during session  Daughter was present & was able to assist with dressing & adjusting brace for pt's comfort when needed  Upon return to room, pt & family educated in importance of increased activity & deep breathing at this time to prevent complications related to immobilty & shallow breathing  Encouraged pt to ambulate with hospital staff outside of therapy when tolerable to maximize mobilty throughout the day  Continue with current POC and discharge plan at this time  Pt needs to trial steps prior to discharge home  Barriers to Discharge: None     PT Discharge Recommendation: Return to previous environment with social support, Home with skilled therapy(OPPT when appropriate)     PT - OK to Discharge: No    See flowsheet documentation for full assessment

## 2020-08-04 NOTE — ASSESSMENT & PLAN NOTE
- Multiple left-sided rib fractures including posteriorly 7-11 and lateral 5-8 with associated small left-sided hemopneumothorax  - Continue rib fracture protocol   -epidural taken off yesterday; she describes somewhat less optimal pain control; will follow up with APS to further optimize her pain control   -currently doing IS at 2000; to continue IS/pulmonary toilet

## 2020-08-05 VITALS
WEIGHT: 120 LBS | SYSTOLIC BLOOD PRESSURE: 159 MMHG | HEART RATE: 80 BPM | TEMPERATURE: 98.6 F | HEIGHT: 66 IN | RESPIRATION RATE: 16 BRPM | OXYGEN SATURATION: 96 % | BODY MASS INDEX: 19.29 KG/M2 | DIASTOLIC BLOOD PRESSURE: 86 MMHG

## 2020-08-05 LAB
ANION GAP SERPL CALCULATED.3IONS-SCNC: 7 MMOL/L (ref 4–13)
BUN SERPL-MCNC: 14 MG/DL (ref 5–25)
CALCIUM SERPL-MCNC: 8.5 MG/DL (ref 8.3–10.1)
CHLORIDE SERPL-SCNC: 105 MMOL/L (ref 100–108)
CO2 SERPL-SCNC: 26 MMOL/L (ref 21–32)
CREAT SERPL-MCNC: 0.57 MG/DL (ref 0.6–1.3)
GFR SERPL CREATININE-BSD FRML MDRD: 85 ML/MIN/1.73SQ M
GLUCOSE SERPL-MCNC: 87 MG/DL (ref 65–140)
POTASSIUM SERPL-SCNC: 3.7 MMOL/L (ref 3.5–5.3)
SODIUM SERPL-SCNC: 138 MMOL/L (ref 136–145)

## 2020-08-05 PROCEDURE — NC001 PR NO CHARGE: Performed by: NURSE PRACTITIONER

## 2020-08-05 PROCEDURE — 99232 SBSQ HOSP IP/OBS MODERATE 35: CPT | Performed by: ANESTHESIOLOGY

## 2020-08-05 PROCEDURE — 80048 BASIC METABOLIC PNL TOTAL CA: CPT | Performed by: PHYSICIAN ASSISTANT

## 2020-08-05 PROCEDURE — 99239 HOSP IP/OBS DSCHRG MGMT >30: CPT | Performed by: EMERGENCY MEDICINE

## 2020-08-05 PROCEDURE — 97116 GAIT TRAINING THERAPY: CPT

## 2020-08-05 RX ORDER — LORATADINE 10 MG/1
10 TABLET ORAL DAILY
Qty: 30 TABLET | Refills: 0 | Status: SHIPPED | OUTPATIENT
Start: 2020-08-06

## 2020-08-05 RX ORDER — ACETAMINOPHEN 325 MG/1
975 TABLET ORAL EVERY 8 HOURS SCHEDULED
Qty: 30 TABLET | Refills: 0 | Status: SHIPPED | OUTPATIENT
Start: 2020-08-05

## 2020-08-05 RX ORDER — OXYCODONE HYDROCHLORIDE 5 MG/1
TABLET ORAL
Qty: 30 TABLET | Refills: 0 | Status: SHIPPED | OUTPATIENT
Start: 2020-08-05

## 2020-08-05 RX ORDER — GABAPENTIN 100 MG/1
100 CAPSULE ORAL 3 TIMES DAILY
Status: DISCONTINUED | OUTPATIENT
Start: 2020-08-05 | End: 2020-08-05 | Stop reason: HOSPADM

## 2020-08-05 RX ORDER — POLYETHYLENE GLYCOL 3350 17 G/17G
17 POWDER, FOR SOLUTION ORAL DAILY PRN
Qty: 14 EACH | Refills: 0 | Status: SHIPPED | OUTPATIENT
Start: 2020-08-05

## 2020-08-05 RX ORDER — METHOCARBAMOL 500 MG/1
250 TABLET, FILM COATED ORAL EVERY 8 HOURS SCHEDULED
Qty: 60 TABLET | Refills: 0 | Status: SHIPPED | OUTPATIENT
Start: 2020-08-05

## 2020-08-05 RX ORDER — AMOXICILLIN 250 MG
1 CAPSULE ORAL 2 TIMES DAILY
Refills: 0
Start: 2020-08-05

## 2020-08-05 RX ADMIN — LIDOCAINE 2 PATCH: 50 PATCH CUTANEOUS at 08:55

## 2020-08-05 RX ADMIN — METHOCARBAMOL 250 MG: 500 TABLET, FILM COATED ORAL at 13:28

## 2020-08-05 RX ADMIN — OXYCODONE HYDROCHLORIDE 5 MG: 5 TABLET ORAL at 05:10

## 2020-08-05 RX ADMIN — AMLODIPINE BESYLATE 5 MG: 5 TABLET ORAL at 08:54

## 2020-08-05 RX ADMIN — LORATADINE 10 MG: 10 TABLET ORAL at 08:54

## 2020-08-05 RX ADMIN — HEPARIN SODIUM 5000 UNITS: 5000 INJECTION INTRAVENOUS; SUBCUTANEOUS at 05:11

## 2020-08-05 RX ADMIN — GABAPENTIN 100 MG: 100 CAPSULE ORAL at 11:05

## 2020-08-05 RX ADMIN — CELECOXIB 200 MG: 200 CAPSULE ORAL at 08:57

## 2020-08-05 RX ADMIN — SENNOSIDES AND DOCUSATE SODIUM 1 TABLET: 8.6; 5 TABLET ORAL at 08:55

## 2020-08-05 RX ADMIN — OXYCODONE HYDROCHLORIDE 5 MG: 5 TABLET ORAL at 13:29

## 2020-08-05 RX ADMIN — METHOCARBAMOL 250 MG: 500 TABLET, FILM COATED ORAL at 05:10

## 2020-08-05 RX ADMIN — ACETAMINOPHEN 975 MG: 325 TABLET, FILM COATED ORAL at 05:11

## 2020-08-05 RX ADMIN — ACETAMINOPHEN 975 MG: 325 TABLET, FILM COATED ORAL at 13:29

## 2020-08-05 RX ADMIN — OXYCODONE HYDROCHLORIDE 5 MG: 5 TABLET ORAL at 08:59

## 2020-08-05 RX ADMIN — HEPARIN SODIUM 5000 UNITS: 5000 INJECTION INTRAVENOUS; SUBCUTANEOUS at 13:30

## 2020-08-05 RX ADMIN — METOPROLOL TARTRATE 25 MG: 25 TABLET, FILM COATED ORAL at 08:54

## 2020-08-05 NOTE — PHYSICAL THERAPY NOTE
Physical Therapy Treatment Note       08/05/20 5039   Pain Assessment   Pain Assessment Tool FLACC   Pain Location/Orientation Location: Back; Location: Rib Cage   Hospital Pain Intervention(s) Repositioned; Ambulation/increased activity   Pain Rating: FLACC (Rest) - Face 1   Pain Rating: FLACC (Rest) - Legs 0   Pain Rating: FLACC (Rest) - Activity 0   Pain Rating: FLACC (Rest) - Cry 0   Pain Rating: FLACC (Rest) - Consolability 0   Score: FLACC (Rest) 1   Pain Rating: FLACC (Activity) - Face 1   Pain Rating: FLACC (Activity) - Legs 0   Pain Rating: FLACC (Activity) - Activity 1   Pain Rating: FLACC (Activity) - Cry 1   Pain Rating: FLACC (Activity) - Consolability 1   Score: FLACC (Activity) 4   Precautions   Spinal Precautions Yes  (TLSO)   Restrictions/Precautions   Weight Bearing Precautions Per Order No   Braces or Orthoses TLSO   Other Precautions Pain; Fall Risk;Spinal precautions   General   Chart Reviewed Yes   Family/Caregiver Present No   Subjective   Subjective Pt states her brace is giving her less pain today  Transfers   Sit to Stand 5  Supervision   Additional items Armrests; Increased time required   Stand to Sit 5  Supervision   Additional items Armrests; Increased time required   Ambulation/Elevation   Gait pattern Excessively slow; Short stride; Inconsistent roland;Decreased foot clearance; Antalgic; Foward flexed   Gait Assistance 5  Supervision   Additional items   (Chair follow)   Assistive Device Rolling walker   Distance 100' x2   Stair Management Assistance 5  Supervision   Additional items Verbal cues   Stair Management Technique Step to pattern; Foreward; Two rails   Number of Stairs 3   Balance   Static Sitting Fair +   Static Standing Fair +   Ambulatory Fair   Endurance Deficit   Endurance Deficit Yes   Endurance Deficit Description Fatigue 2/2 SOB   Activity Tolerance   Activity Tolerance Patient tolerated treatment well;Patient limited by fatigue   Nurse [address] [place] [address]  , RN   Assessment Prognosis Good   Problem List Decreased strength;Decreased range of motion;Decreased endurance; Impaired balance;Decreased mobility; Decreased coordination;Decreased safety awareness;Orthopedic restrictions;Pain   Assessment Pt tolerated today's Tx well without offering complaints of increases in pain but with some SOB  Pt is demonstrating increased ability to safley complete sit to stands without assistance  Pt is also able to ambulate household distances and nearing community distances without LOB or need for physical assistance but with some SOB using RW  Pt was able to negotiate steps today with encouragement but without need for assistance from therapist  Pt's brace did not require any adjustment this session  Pt was encouraged to ambulate with hospital staff to increase mobility  Pt continues to progress towards goals  Pt will do well returning to daughter's home, with increased support and assistance for mobility tasks to facilitate return to PLOF  Barriers to Discharge None   Goals   Patient Goals To go home   STG Expiration Date 08/10/20   PT Treatment Day 2   Plan   Treatment/Interventions Functional transfer training;Elevations;LE strengthening/ROM; Therapeutic exercise; Endurance training;Patient/family training;Equipment eval/education; Bed mobility;Gait training   Progress Progressing toward goals   PT Frequency   (3-5x/week)   Recommendation   PT Discharge Recommendation Return to previous environment with social support;Home with skilled therapy  (OP PT)   Equipment Recommended Obie Castleman   PT - OK to Discharge Yes     JIMMY Tee

## 2020-08-05 NOTE — PLAN OF CARE
Problem: Potential for Falls  Goal: Patient will remain free of falls  Description: INTERVENTIONS:  - Assess patient frequently for physical needs  -  Identify cognitive and physical deficits and behaviors that affect risk of falls    -  [place] fall precautions as indicated by assessment   - Educate patient/family on patient safety including physical limitations  - Instruct patient to call for assistance with activity based on assessment  - Modify environment to reduce risk of injury  - Consider OT/PT consult to assist with strengthening/mobility  8/5/2020 1413 by Flori Ortega  Outcome: Adequate for Discharge  8/5/2020 1257 by Flori Ortega  Outcome: Progressing  8/5/2020 1204 by Flori Ortega  Outcome: Progressing     Problem: PAIN - ADULT  Goal: Verbalizes/displays adequate comfort level or baseline comfort level  Description: Interventions:  - Encourage patient to monitor pain and request assistance  - Assess pain using appropriate pain scale  - Administer analgesics based on type and severity of pain and evaluate response  - Implement non-pharmacological measures as appropriate and evaluate response  - Consider cultural and social influences on pain and pain management  - Notify physician/advanced practitioner if interventions unsuccessful or patient reports new pain  8/5/2020 1413 by Flori Ortega  Outcome: Adequate for Discharge  8/5/2020 1257 by Flori Ortega  Outcome: Progressing  8/5/2020 1204 by Flori Ortega  Outcome: Progressing     Problem: INFECTION - ADULT  Goal: Absence or prevention of progression during hospitalization  Description: INTERVENTIONS:  - Assess and monitor for signs and symptoms of infection  - Monitor lab/diagnostic results  - Monitor all insertion sites, i e  indwelling lines, tubes, and drains  - Monitor endotracheal if appropriate and nasal secretions for changes in amount and color  - [place] appropriate cooling/warming therapies per order  - Administer medications as ordered  - Instruct and encourage patient and family to use good hand hygiene technique  - Identify and instruct in appropriate isolation precautions for identified infection/condition  8/5/2020 1413 by Samy Castellano  Outcome: Adequate for Discharge  8/5/2020 1257 by Samy Castellano  Outcome: Progressing  8/5/2020 1204 by Samy Castellano  Outcome: Progressing  Goal: Absence of fever/infection during neutropenic period  Description: INTERVENTIONS:  - Monitor WBC    8/5/2020 1413 by Samy Castellano  Outcome: Adequate for Discharge  8/5/2020 1257 by Samy Castellano  Outcome: Progressing  8/5/2020 1204 by Samy Castellano  Outcome: Progressing     Problem: SAFETY ADULT  Goal: Maintain or return to baseline ADL function  Description: INTERVENTIONS:  -  Assess patient's ability to carry out ADLs; assess patient's baseline for ADL function and identify physical deficits which impact ability to perform ADLs (bathing, care of mouth/teeth, toileting, grooming, dressing, etc )  - Assess/evaluate cause of self-care deficits   - Assess range of motion  - Assess patient's mobility; develop plan if impaired  - Assess patient's need for assistive devices and provide as appropriate  - Encourage maximum independence but intervene and supervise when necessary  - Involve family in performance of ADLs  - Assess for home care needs following discharge   - Consider OT consult to assist with ADL evaluation and planning for discharge  - Provide patient education as appropriate  8/5/2020 1413 by Samy Castellano  Outcome: Adequate for Discharge  8/5/2020 1257 by Samy Castellano  Outcome: Progressing  8/5/2020 1204 by Samy Castellano  Outcome: Progressing  Goal: Maintain or return mobility status to optimal level  Description: INTERVENTIONS:  - Assess patient's baseline mobility status (ambulation, transfers, stairs, etc )    - Identify cognitive and physical deficits and behaviors that affect mobility  - Identify mobility aids required to assist with transfers and/or ambulation (gait belt, sit-to-stand, lift, walker, cane, etc )  - [place] fall precautions as indicated by assessment  - Record patient progress and toleration of activity level on Mobility SBAR; progress patient to next Phase/Stage  - Instruct patient to call for assistance with activity based on assessment  - Consider rehabilitation consult to assist with strengthening/weightbearing, etc   8/5/2020 1413 by Sandrine Fraga  Outcome: Adequate for Discharge  8/5/2020 1257 by Sandrine Cuff  Outcome: Progressing  8/5/2020 1204 by Sandrine Cuff  Outcome: Progressing     Problem: DISCHARGE PLANNING  Goal: Discharge to home or other facility with appropriate resources  Description: INTERVENTIONS:  - Identify barriers to discharge w/patient and caregiver  - Arrange for needed discharge resources and transportation as appropriate  - Identify discharge learning needs (meds, wound care, etc )  - Arrange for interpretive services to assist at discharge as needed  - Refer to Case Management Department for coordinating discharge planning if the patient needs post-hospital services based on physician/advanced practitioner order or complex needs related to functional status, cognitive ability, or social support system  8/5/2020 1413 by Sandrine Fraga  Outcome: Adequate for Discharge  8/5/2020 1257 by Sandrine Fraga  Outcome: Progressing  8/5/2020 1204 by Sandrine Cuff  Outcome: Progressing     Problem: Knowledge Deficit  Goal: Patient/family/caregiver demonstrates understanding of disease process, treatment plan, medications, and discharge instructions  Description: Complete learning assessment and assess knowledge base    Interventions:  - Provide teaching at level of understanding  - Provide teaching via preferred learning methods  8/5/2020 1413 by Sandrine Fraga  Outcome: Adequate for Discharge  8/5/2020 1257 by Sandrine Cuff  Outcome: Progressing  8/5/2020 1204 by Sandrine Cuff  Outcome: Progressing     Problem: Prexisting or High Potential for Compromised Skin Integrity  Goal: Skin integrity is maintained or improved  Description: INTERVENTIONS:  - Identify patients at risk for skin breakdown  - Assess and monitor skin integrity  - Assess and monitor nutrition and hydration status  - Monitor labs   - Assess for incontinence   - Turn and reposition patient  - Assist with mobility/ambulation  - Relieve pressure over bony prominences  - Avoid friction and shearing  - Provide appropriate hygiene as needed including keeping skin clean and dry  - Evaluate need for skin moisturizer/barrier cream  - Collaborate with interdisciplinary team   - Patient/family teaching  - Consider wound care consult   8/5/2020 1413 by Magdi Alvarenga  Outcome: Adequate for Discharge  8/5/2020 1257 by Magdi Alvarenga  Outcome: Progressing  8/5/2020 1204 by Magdi Alvarenga  Outcome: Progressing     Problem: Nutrition/Hydration-ADULT  Goal: Nutrient/Hydration intake appropriate for improving, restoring or maintaining nutritional needs  Description: Monitor and assess patient's nutrition/hydration status for malnutrition  Collaborate with interdisciplinary team and initiate plan and interventions as ordered  Monitor patient's weight and dietary intake as ordered or per policy  Utilize nutrition screening tool and intervene as necessary  Determine patient's food preferences and provide high-protein, high-caloric foods as appropriate       INTERVENTIONS:  - Monitor oral intake, urinary output, labs, and treatment plans  - Assess nutrition and hydration status and recommend course of action  - Evaluate amount of meals eaten  - Assist patient with eating if necessary   - Allow adequate time for meals  - Recommend/ encourage appropriate diets, oral nutritional supplements, and vitamin/mineral supplements  - Order, calculate, and assess calorie counts as needed  - Recommend, monitor, and adjust tube feedings and TPN/PPN based on assessed needs  - Assess need for intravenous fluids  - Provide specific nutrition/hydration education as appropriate  - Include patient/family/caregiver in decisions related to nutrition  8/5/2020 1413 by Juvenal Aguilar  Outcome: Adequate for Discharge  8/5/2020 1257 by Juvenal Aguilar  Outcome: Progressing  8/5/2020 1204 by Juvenal Aguilar  Outcome: Progressing

## 2020-08-05 NOTE — PLAN OF CARE
Problem: Potential for Falls  Goal: Patient will remain free of falls  Description: INTERVENTIONS:  - Assess patient frequently for physical needs  -  Identify cognitive and physical deficits and behaviors that affect risk of falls    -  [place] fall precautions as indicated by assessment   - Educate patient/family on patient safety including physical limitations  - Instruct patient to call for assistance with activity based on assessment  - Modify environment to reduce risk of injury  - Consider OT/PT consult to assist with strengthening/mobility  8/5/2020 1257 by Andres Worthy  Outcome: Progressing  8/5/2020 1204 by Andres Worthy  Outcome: Progressing     Problem: PAIN - ADULT  Goal: Verbalizes/displays adequate comfort level or baseline comfort level  Description: Interventions:  - Encourage patient to monitor pain and request assistance  - Assess pain using appropriate pain scale  - Administer analgesics based on type and severity of pain and evaluate response  - Implement non-pharmacological measures as appropriate and evaluate response  - Consider cultural and social influences on pain and pain management  - Notify physician/advanced practitioner if interventions unsuccessful or patient reports new pain  8/5/2020 1257 by Andres Worthy  Outcome: Progressing  8/5/2020 1204 by Andres Worthy  Outcome: Progressing     Problem: INFECTION - ADULT  Goal: Absence or prevention of progression during hospitalization  Description: INTERVENTIONS:  - Assess and monitor for signs and symptoms of infection  - Monitor lab/diagnostic results  - Monitor all insertion sites, i e  indwelling lines, tubes, and drains  - Monitor endotracheal if appropriate and nasal secretions for changes in amount and color  - [place] appropriate cooling/warming therapies per order  - Administer medications as ordered  - Instruct and encourage patient and family to use good hand hygiene technique  - Identify and instruct in appropriate isolation precautions for identified infection/condition  8/5/2020 1257 by Glendy Rainey  Outcome: Progressing  8/5/2020 1204 by Glendy Rainey  Outcome: Progressing  Goal: Absence of fever/infection during neutropenic period  Description: INTERVENTIONS:  - Monitor WBC    8/5/2020 1257 by Glendy Rainey  Outcome: Progressing  8/5/2020 1204 by Glendy Rainey  Outcome: Progressing     Problem: SAFETY ADULT  Goal: Maintain or return to baseline ADL function  Description: INTERVENTIONS:  -  Assess patient's ability to carry out ADLs; assess patient's baseline for ADL function and identify physical deficits which impact ability to perform ADLs (bathing, care of mouth/teeth, toileting, grooming, dressing, etc )  - Assess/evaluate cause of self-care deficits   - Assess range of motion  - Assess patient's mobility; develop plan if impaired  - Assess patient's need for assistive devices and provide as appropriate  - Encourage maximum independence but intervene and supervise when necessary  - Involve family in performance of ADLs  - Assess for home care needs following discharge   - Consider OT consult to assist with ADL evaluation and planning for discharge  - Provide patient education as appropriate  8/5/2020 1257 by Glendy Rainey  Outcome: Progressing  8/5/2020 1204 by Glendy Rainey  Outcome: Progressing  Goal: Maintain or return mobility status to optimal level  Description: INTERVENTIONS:  - Assess patient's baseline mobility status (ambulation, transfers, stairs, etc )    - Identify cognitive and physical deficits and behaviors that affect mobility  - Identify mobility aids required to assist with transfers and/or ambulation (gait belt, sit-to-stand, lift, walker, cane, etc )  - [place] fall precautions as indicated by assessment  - Record patient progress and toleration of activity level on Mobility SBAR; progress patient to next Phase/Stage  - Instruct patient to call for assistance with activity based on assessment  - Consider rehabilitation consult to assist with strengthening/weightbearing, etc   8/5/2020 1257 by Elton Morris  Outcome: Progressing  8/5/2020 1204 by Elton Morris  Outcome: Progressing     Problem: DISCHARGE PLANNING  Goal: Discharge to home or other facility with appropriate resources  Description: INTERVENTIONS:  - Identify barriers to discharge w/patient and caregiver  - Arrange for needed discharge resources and transportation as appropriate  - Identify discharge learning needs (meds, wound care, etc )  - Arrange for interpretive services to assist at discharge as needed  - Refer to Case Management Department for coordinating discharge planning if the patient needs post-hospital services based on physician/advanced practitioner order or complex needs related to functional status, cognitive ability, or social support system  8/5/2020 1257 by Elton Morris  Outcome: Progressing  8/5/2020 1204 by Elton Morris  Outcome: Progressing     Problem: Knowledge Deficit  Goal: Patient/family/caregiver demonstrates understanding of disease process, treatment plan, medications, and discharge instructions  Description: Complete learning assessment and assess knowledge base    Interventions:  - Provide teaching at level of understanding  - Provide teaching via preferred learning methods  8/5/2020 1257 by Elton Morris  Outcome: Progressing  8/5/2020 1204 by Elton Morris  Outcome: Progressing     Problem: Prexisting or High Potential for Compromised Skin Integrity  Goal: Skin integrity is maintained or improved  Description: INTERVENTIONS:  - Identify patients at risk for skin breakdown  - Assess and monitor skin integrity  - Assess and monitor nutrition and hydration status  - Monitor labs   - Assess for incontinence   - Turn and reposition patient  - Assist with mobility/ambulation  - Relieve pressure over bony prominences  - Avoid friction and shearing  - Provide appropriate hygiene as needed including keeping skin clean and dry  - Evaluate need for skin moisturizer/barrier cream  - Collaborate with interdisciplinary team   - Patient/family teaching  - Consider wound care consult   8/5/2020 1257 by Delmis Canela  Outcome: Progressing  8/5/2020 1204 by Delmis Canela  Outcome: Progressing     Problem: Nutrition/Hydration-ADULT  Goal: Nutrient/Hydration intake appropriate for improving, restoring or maintaining nutritional needs  Description: Monitor and assess patient's nutrition/hydration status for malnutrition  Collaborate with interdisciplinary team and initiate plan and interventions as ordered  Monitor patient's weight and dietary intake as ordered or per policy  Utilize nutrition screening tool and intervene as necessary  Determine patient's food preferences and provide high-protein, high-caloric foods as appropriate       INTERVENTIONS:  - Monitor oral intake, urinary output, labs, and treatment plans  - Assess nutrition and hydration status and recommend course of action  - Evaluate amount of meals eaten  - Assist patient with eating if necessary   - Allow adequate time for meals  - Recommend/ encourage appropriate diets, oral nutritional supplements, and vitamin/mineral supplements  - Order, calculate, and assess calorie counts as needed  - Recommend, monitor, and adjust tube feedings and TPN/PPN based on assessed needs  - Assess need for intravenous fluids  - Provide specific nutrition/hydration education as appropriate  - Include patient/family/caregiver in decisions related to nutrition  8/5/2020 1257 by Delmis Canela  Outcome: Progressing  8/5/2020 1204 by Delmis Canela  Outcome: Progressing

## 2020-08-05 NOTE — PLAN OF CARE
Problem: PHYSICAL THERAPY ADULT  Goal: Performs mobility at highest level of function for planned discharge setting  See evaluation for individualized goals  Description: Treatment/Interventions: Functional transfer training, LE strengthening/ROM, Elevations, Therapeutic exercise, Endurance training, Patient/family training, Equipment eval/education, Bed mobility, Gait training, Spoke to nursing, OT  Equipment Recommended: Walker(RW)       See flowsheet documentation for full assessment, interventions and recommendations  Outcome: Progressing  Note: Prognosis: Good  Problem List: Decreased strength, Decreased range of motion, Decreased endurance, Impaired balance, Decreased mobility, Decreased coordination, Decreased safety awareness, Orthopedic restrictions, Pain  Assessment: Pt tolerated today's Tx well without offering complaints of increases in pain but with some SOB  Pt is demonstrating increased ability to safley complete sit to stands without assistance  Pt is also able to ambulate household distances and nearing community distances without LOB or need for physical assistance but with some SOB using RW  Pt was able to negotiate steps today with encouragement but without need for assistance from therapist  Pt's brace did not require any adjustment this session  Pt was encouraged to ambulate with hospital staff to increase mobility  Pt continues to progress towards goals  Pt will do well returning to daughter's home, with increased support and assistance for mobility tasks to facilitate return to PLOF  Barriers to Discharge: None     PT Discharge Recommendation: Return to previous environment with social support, Home with skilled therapy(OP PT)     PT - OK to Discharge: Yes    See flowsheet documentation for full assessment

## 2020-08-05 NOTE — PLAN OF CARE
Problem: Potential for Falls  Goal: Patient will remain free of falls  Description: INTERVENTIONS:  - Assess patient frequently for physical needs  -  Identify cognitive and physical deficits and behaviors that affect risk of falls    -  [place] fall precautions as indicated by assessment   - Educate patient/family on patient safety including physical limitations  - Instruct patient to call for assistance with activity based on assessment  - Modify environment to reduce risk of injury  - Consider OT/PT consult to assist with strengthening/mobility  Outcome: Progressing     Problem: PAIN - ADULT  Goal: Verbalizes/displays adequate comfort level or baseline comfort level  Description: Interventions:  - Encourage patient to monitor pain and request assistance  - Assess pain using appropriate pain scale  - Administer analgesics based on type and severity of pain and evaluate response  - Implement non-pharmacological measures as appropriate and evaluate response  - Consider cultural and social influences on pain and pain management  - Notify physician/advanced practitioner if interventions unsuccessful or patient reports new pain  Outcome: Progressing     Problem: INFECTION - ADULT  Goal: Absence or prevention of progression during hospitalization  Description: INTERVENTIONS:  - Assess and monitor for signs and symptoms of infection  - Monitor lab/diagnostic results  - Monitor all insertion sites, i e  indwelling lines, tubes, and drains  - Monitor endotracheal if appropriate and nasal secretions for changes in amount and color  - [place] appropriate cooling/warming therapies per order  - Administer medications as ordered  - Instruct and encourage patient and family to use good hand hygiene technique  - Identify and instruct in appropriate isolation precautions for identified infection/condition  Outcome: Progressing  Goal: Absence of fever/infection during neutropenic period  Description: INTERVENTIONS:  - Monitor WBC    Outcome: Progressing     Problem: SAFETY ADULT  Goal: Maintain or return to baseline ADL function  Description: INTERVENTIONS:  -  Assess patient's ability to carry out ADLs; assess patient's baseline for ADL function and identify physical deficits which impact ability to perform ADLs (bathing, care of mouth/teeth, toileting, grooming, dressing, etc )  - Assess/evaluate cause of self-care deficits   - Assess range of motion  - Assess patient's mobility; develop plan if impaired  - Assess patient's need for assistive devices and provide as appropriate  - Encourage maximum independence but intervene and supervise when necessary  - Involve family in performance of ADLs  - Assess for home care needs following discharge   - Consider OT consult to assist with ADL evaluation and planning for discharge  - Provide patient education as appropriate  Outcome: Progressing  Goal: Maintain or return mobility status to optimal level  Description: INTERVENTIONS:  - Assess patient's baseline mobility status (ambulation, transfers, stairs, etc )    - Identify cognitive and physical deficits and behaviors that affect mobility  - Identify mobility aids required to assist with transfers and/or ambulation (gait belt, sit-to-stand, lift, walker, cane, etc )  - [place] fall precautions as indicated by assessment  - Record patient progress and toleration of activity level on Mobility SBAR; progress patient to next Phase/Stage  - Instruct patient to call for assistance with activity based on assessment  - Consider rehabilitation consult to assist with strengthening/weightbearing, etc   Outcome: Progressing     Problem: DISCHARGE PLANNING  Goal: Discharge to home or other facility with appropriate resources  Description: INTERVENTIONS:  - Identify barriers to discharge w/patient and caregiver  - Arrange for needed discharge resources and transportation as appropriate  - Identify discharge learning needs (meds, wound care, etc )  - Arrange for interpretive services to assist at discharge as needed  - Refer to Case Management Department for coordinating discharge planning if the patient needs post-hospital services based on physician/advanced practitioner order or complex needs related to functional status, cognitive ability, or social support system  Outcome: Progressing     Problem: Knowledge Deficit  Goal: Patient/family/caregiver demonstrates understanding of disease process, treatment plan, medications, and discharge instructions  Description: Complete learning assessment and assess knowledge base  Interventions:  - Provide teaching at level of understanding  - Provide teaching via preferred learning methods  Outcome: Progressing     Problem: Prexisting or High Potential for Compromised Skin Integrity  Goal: Skin integrity is maintained or improved  Description: INTERVENTIONS:  - Identify patients at risk for skin breakdown  - Assess and monitor skin integrity  - Assess and monitor nutrition and hydration status  - Monitor labs   - Assess for incontinence   - Turn and reposition patient  - Assist with mobility/ambulation  - Relieve pressure over bony prominences  - Avoid friction and shearing  - Provide appropriate hygiene as needed including keeping skin clean and dry  - Evaluate need for skin moisturizer/barrier cream  - Collaborate with interdisciplinary team   - Patient/family teaching  - Consider wound care consult   Outcome: Progressing     Problem: Nutrition/Hydration-ADULT  Goal: Nutrient/Hydration intake appropriate for improving, restoring or maintaining nutritional needs  Description: Monitor and assess patient's nutrition/hydration status for malnutrition  Collaborate with interdisciplinary team and initiate plan and interventions as ordered  Monitor patient's weight and dietary intake as ordered or per policy  Utilize nutrition screening tool and intervene as necessary   Determine patient's food preferences and provide high-protein, high-caloric foods as appropriate       INTERVENTIONS:  - Monitor oral intake, urinary output, labs, and treatment plans  - Assess nutrition and hydration status and recommend course of action  - Evaluate amount of meals eaten  - Assist patient with eating if necessary   - Allow adequate time for meals  - Recommend/ encourage appropriate diets, oral nutritional supplements, and vitamin/mineral supplements  - Order, calculate, and assess calorie counts as needed  - Recommend, monitor, and adjust tube feedings and TPN/PPN based on assessed needs  - Assess need for intravenous fluids  - Provide specific nutrition/hydration education as appropriate  - Include patient/family/caregiver in decisions related to nutrition  Outcome: Progressing

## 2020-08-05 NOTE — ASSESSMENT & PLAN NOTE
- Acute pain due to multiple traumatic injuries, present on admission  Patient with history of chronic pain and takes Celebrex and Neurontin at baseline   -follow up with APS, recommend adding gabapentin at 100 mg TID and patient agreeable   - reviewed side effects of robaxin and gabapentin in geriatric population to include confusion  Patient agreeable to monitor for symptoms   She reports improved pain control since adding low dose robaxin yesterday therefore will continue this regimen upon discharge

## 2020-08-05 NOTE — ASSESSMENT & PLAN NOTE
- Small left-sided hemopneumothorax, present on admission   - repeat CXR on 07/31 did not identify any persistent pneumothorax  - Management of rib fractures as noted

## 2020-08-05 NOTE — DISCHARGE SUMMARY
Discharge Summary - Adarsh Burns 80 y o  female MRN: [id number]    [facility] Encounter: [id number]    Admission Date:   Admission Orders (From admission, onward)     Ordered        07/30/20 0113  Inpatient Admission  Once                     Admitting Diagnosis: Hydropneumothorax [J94 8]  Injury, unspecified, initial encounter [T14 90XA]  Compression fracture of T12 vertebra, initial encounter ([facility] 75 ) [S22 080A]  Closed fracture of multiple ribs of left side, initial encounter [S22 42XA]  Unspecified multiple injuries, initial encounter [T07  XXXA]    HPI: Per Dr Atul Foreman on admission "Adarsh Burns is a 80 y o  female who presents via EMS after falling today at her daughter's home  She states that she has been home bound for the past 2 years, and was visiting her daughter  She routinely walks with ambulatory aid from a cane, but made no attempt to go to the bathroom without use of her cane, at which point she fell down a number of steps and then complained of left-sided chest pain  She notes that the pain is worse with respiration  She states that she did not feel dizzy or lightheaded at the time of her fall, did not strike her head and did not lose consciousness  She does take baby aspirin fairly routinely, but no other blood thinning medications  She has a history of gabapentin use and occasionally takes tramadol as well  Presently she is alert oriented and able to relate the details of her medical history and the events surrounding this fall clearly to me  Her daughter was also present at the time that she fell and corroborates her account of the fall "    Procedures Performed: No orders of the defined types were placed in this encounter  Summary of Hospital Course: Ms Quintin Manzo was treated for multiple left sided rib fractures, T12 compression fracture  She was recommended for TLSO brace by neurosurgery which was fitted and she had stable upright Xrays   She will follow with them as an outpatient  She had APS consultation and had epidural placed for pain control  Her epidural was DC'd on Monday 8/3 and her pain regimen was adjusted  She is achieving 1000 consistently on IS and remains on room air  She is tolerating OOB to chair and ambulation to the bathroom  She is stable for DC to home as recommended after working with PT and OT  Her daughter is able to provide additional support at home as needed  She will follow-up in trauma clinic in 2 weeks  Significant Findings, Care, Treatment and Services Provided: Xr Chest Pa & Lateral (24 Hours After Admission)    Result Date: 7/31/2020  Impression: Left basal consolidation could relate to effusion, atelectasis and/or pneumonia  Displaced left rib fractures are again identified as seen on recent CT  No obvious pneumothorax, tiny pneumothorax would be better evaluated with CT  No mediastinal shift  Compression fracture of T12 vertebral body as seen on CT  Workstation performed: [id number]     Xr Spine Thoracolumbar 2 Vw    Result Date: 7/31/2020  Impression: Superior endplate compression fracture of T12 is again identified, not significant changed with approximately 30-40% height loss    Questionable fracture line through the L4 vertebral body was not seen on prior CT, follow-up lumbar spine CT recommended for further evaluation  Diffuse degenerative changes throughout the thoracolumbar spine  Workstation performed: [id number]       Complications: none    Discharge Diagnosis:   Patient Active Problem List   Diagnosis    Fall    Closed fracture of multiple ribs of left side    Hemopneumothorax on left    Closed wedge compression fracture of T12 vertebra (HCC)    Acute pain due to trauma    Hypertension         Resolved Problems  Date Reviewed: 8/5/2020    None          Condition at Discharge: stable         Discharge instructions/Information to patient and family:   See after visit summary for information provided to patient and family  Provisions for Follow-Up Care:  See after visit summary for information related to follow-up care and any pertinent home health orders  PCP: Mary Hughes    Disposition: Home    Planned Readmission: No      Discharge Statement   I spent 31 minutes discharging the patient  This time was spent on the day of discharge  I had direct contact with the patient on the day of discharge  Additional documentation is required if more than 30 minutes were spent on discharge  Discharge Medications:  See after visit summary for reconciled discharge medications provided to patient and family

## 2020-08-05 NOTE — PROGRESS NOTES
Progress Note - Acute Pain Service    Evelyne Olivo 80 y o  female MRN: [id number]  Unit/Bed#: [facility] 605-01 Encounter: [id number]      Assessment:   Principal Problem:    Closed fracture of multiple ribs of left side  Active Problems:    Fall    Hemopneumothorax on left    Closed wedge compression fracture of T12 vertebra (HCC)    Acute pain due to trauma    Hypertension      Plan:   · Continue acetaminophen 975 mg p o  q 8 hours scheduled  · Continue celecoxib 200 mg p o  b i d  scheduled  · Continue oxycodone 2 5 mg p o  q 4 hours p r n  moderate pain  · Continue oxycodone 5 mg p o  q 4 hours p r n  severe pain  · Continue hydromorphone 0 2 mg IV q 4 hours p r n  breakthrough pain  · Continue methocarbamol 250 mg p o  q 8 hours scheduled  · Continue lidocaine 5% patch, 2 patches to ribs for 12 hours daily  · Suggest start gabapentin 100 mg p o  t i d   Patient was on 300 mg b i d  prior to admission and has not been on gabapentin during admission  Patient states that her home dose of gabapentin was causing her to feel goofy  · Suggest discharge on above regimen with the exception of IV hydromorphone when appropriate  APS sign off  Thank you for the Consult  Please contact APS ( [phone]) with any further questions    Pain History  Current pain location(s):  Left chest wall, mid back  Pain Scale:   4/10  Quality:  Sharp  24 hour history:  Patient states pain is worse since removal of epidural catheter several days ago  Asked to re-evaluate patient by trauma service  Patient currently sitting in a chair with brace on and states she is comfortable  She states her pain is improved when she is up and moving around and worse when sitting or lying down  She states that the oral pain medications relieved the pain and make her comfortable  Apparently, her and her daughter have been concerned that she may fall at discharge and have been reluctant for her to be discharged    Patient expressed this concern to me as well  She has been cleared by physical therapy  She was also very concerned about the possibility of having pain at home and asked if she would have pain medication when she leaves the hospital   I explained that she would be discharged with appropriate medications  Opioid requirement previous 24 hours: For oxycodone 27 5 mg p o , hydromorphone 0 4 mg IV  Meds/Allergies   all current active meds have been reviewed, current meds:   Current Facility-Administered Medications   Medication Dose Route Frequency    acetaminophen (TYLENOL) tablet 975 mg  975 mg Oral Q8H [address]    amLODIPine (NORVASC) tablet 5 mg  5 mg Oral Daily    atorvastatin (LIPITOR) tablet 20 mg  20 mg Oral After Dinner    celecoxib (CeleBREX) capsule 200 mg  200 mg Oral BID    gabapentin (NEURONTIN) capsule 100 mg  100 mg Oral TID    heparin (porcine) subcutaneous injection 5,000 Units  5,000 Units Subcutaneous Q8H [address]    HYDROmorphone (DILAUDID) injection 0 2 mg  0 2 mg Intravenous Q4H PRN    lactated ringers bolus 500 mL  500 mL Intravenous Once PRN    lidocaine (LIDODERM) 5 % patch 2 patch  2 patch Topical Daily    loratadine (CLARITIN) tablet 10 mg  10 mg Oral Daily    melatonin tablet 3 mg  3 mg Oral HS    methocarbamol (ROBAXIN) tablet 250 mg  250 mg Oral Q8H [address]    metoprolol tartrate (LOPRESSOR) tablet 25 mg  25 mg Oral Q12H ESTELLE    oxyCODONE (ROXICODONE) IR tablet 2 5 mg  2 5 mg Oral Q4H PRN    oxyCODONE (ROXICODONE) IR tablet 5 mg  5 mg Oral Q4H PRN    polyethylene glycol (MIRALAX) packet 17 g  17 g Oral Daily PRN    senna-docusate sodium (SENOKOT S) 8 6-50 mg per tablet 1 tablet  1 tablet Oral BID    traZODone (DESYREL) tablet 50 mg  50 mg Oral HS    and PTA meds:   Prior to Admission Medications   Prescriptions Last Dose Informant Patient Reported? Taking?    amLODIPine (NORVASC) 5 mg tablet   Yes Yes   Sig: Take 5 mg by mouth daily   atorvastatin (LIPITOR) 20 mg tablet   Yes Yes Sig: Take 20 mg by mouth daily   celecoxib (CeleBREX) 100 mg capsule   Yes Yes   Sig: Take 200 mg by mouth 2 (two) times a day    gabapentin (NEURONTIN) 300 mg capsule   Yes Yes   Sig: Take 300 mg by mouth 2 (two) times a day   metoprolol succinate (TOPROL-XL) 100 mg 24 hr tablet   Yes Yes   Sig: Take 100 mg by mouth daily   traZODone (DESYREL) 50 mg tablet   Yes Yes   Sig: Take 50 mg by mouth daily at bedtime      Facility-Administered Medications: None       No Known Allergies    Objective     Temp:  [98 6 °F (37 °C)-98 8 °F (37 1 °C)] 98 6 °F (37 °C)  HR:  [73-98] 80  Resp:  [16-18] 16  BP: (139-159)/(73-86) 159/86    Physical Exam  Constitutional:       General: She is not in acute distress  Eyes:      Conjunctiva/sclera: Conjunctivae normal       Pupils: Pupils are equal, round, and reactive to light  Cardiovascular:      Rate and Rhythm: Normal rate and regular rhythm  Pulmonary:      Effort: Pulmonary effort is normal       Breath sounds: Normal breath sounds and air entry  Skin:     General: Skin is warm and dry  Neurological:      Mental Status: She is alert and oriented to person, place, and time  GCS: GCS eye subscore is 4  GCS verbal subscore is 5  GCS motor subscore is 6  Psychiatric:         Behavior: Behavior is cooperative  Lab Results:   Results from last 7 days   Lab Units 08/01/20  0444   WBC Thousand/uL 6 41   HEMOGLOBIN g/dL 10 7*   HEMATOCRIT % 32 4*   PLATELETS Thousands/uL 178      Results from last 7 days   Lab Units 08/05/20  0442   POTASSIUM mmol/L 3 7   CHLORIDE mmol/L 105   CO2 mmol/L 26   BUN mg/dL 14   CREATININE mg/dL 0 57*   CALCIUM mg/dL 8 5       Imaging Studies: I have personally reviewed pertinent reports  EKG, Pathology, and Other Studies: I have personally reviewed pertinent reports  Counseling / Coordination of Care  Total floor / unit time spent today 20 minutes   Greater than 50% of total time was spent with the patient and / or family counseling and / or coordination of care  A description of the counseling / coordination of care:  Patient interview, physical examination, review of medical record, review of imaging and laboratory data, development of pain management plan, discussion of pain management plan with patient and primary service      Josi Lopez PA-C

## 2020-08-05 NOTE — ASSESSMENT & PLAN NOTE
- Multiple left-sided rib fractures including posteriorly 7-11 and lateral 5-8 with associated small left-sided hemopneumothorax  - Continue rib fracture protocol   -epidural taken off Monday, reports pain is uncontrolled but admits she is tolerating OOB to chair and is consistently getting 1000 on IS  - APS re-eval and also agreeable that analgesia is optimized at this time and that goal for pain management is functional, which patient is meeting at this time   She verbalizes understanding and is in agreement with plan for DC from hospital   - PT/OT recommending DC to home with daughter, stable for DC today

## 2020-08-05 NOTE — PROGRESS NOTES
Progress Note - Twyla Mehta 1935, 80 y o  female MRN: [id number]    Unit/Bed#: [facility] 605-01 Encounter: [id number]    Primary Care Provider: Chad Bradley   Date and time admitted to hospital: 7/29/2020  8:57 PM        Hypertension  Assessment & Plan  - Chronic history hypertension   - Continue home medication regimen including Norvasc and metoprolol   - Outpatient follow-up with primary care provider  Acute pain due to trauma  Assessment & Plan  - Acute pain due to multiple traumatic injuries, present on admission  Patient with history of chronic pain and takes Celebrex and Neurontin at baseline   -follow up with APS, recommend adding gabapentin at 100 mg TID and patient agreeable   - reviewed side effects of robaxin and gabapentin in geriatric population to include confusion  Patient agreeable to monitor for symptoms  She reports improved pain control since adding low dose robaxin yesterday therefore will continue this regimen upon discharge     Closed wedge compression fracture of T12 vertebra (HCC)  Assessment & Plan  - T12 compression fracture  - Appreciate Neurosurgery evaluation and recommendations  Non operative management recommended  - Maintain TLSO brace whenever upright and/or out of bed, or if patient unable to tolerate secondary to multiple rib fractures can wear brace p r n  For comfort  - upright Xrays stable, neurosurgery signed off   - Continue multimodal analgesic regimen   - PT and OT evaluation and treatment as indicated - recommended for DC to home with family support    Hemopneumothorax on left  Assessment & Plan  - Small left-sided hemopneumothorax, present on admission   - repeat CXR on 07/31 did not identify any persistent pneumothorax  - Management of rib fractures as noted  Fall  Assessment & Plan  - Status post fall with the below noted injuries  - Fall precautions   - PT and OT evaluation and treatment as indicated    - Arya Lockhart  Medicine evaluation and treatment as indicated  - Case management consult for disposition planning  * Closed fracture of multiple ribs of left side  Assessment & Plan  - Multiple left-sided rib fractures including posteriorly 7-11 and lateral 5-8 with associated small left-sided hemopneumothorax  - Continue rib fracture protocol   -epidural taken off Monday, reports pain is uncontrolled but admits she is tolerating OOB to chair and is consistently getting 1000 on IS  - APS re-eval and also agreeable that analgesia is optimized at this time and that goal for pain management is functional, which patient is meeting at this time  She verbalizes understanding and is in agreement with plan for DC from hospital   - PT/OT recommending DC to home with daughter, stable for DC today               Disposition: stable for DC       SUBJECTIVE:  Chief Complaint: pain in rib cage    Subjective: denies back pain, reports rib cage pain but admits to some improvement since robaxin was added  She denies SOB, cough or difficulty with IS/Deep breathing  She denies symptoms of constipation, nausea or vomiting         OBJECTIVE:     Meds/Allergies     Current Facility-Administered Medications:     acetaminophen (TYLENOL) tablet 975 mg, 975 mg, Oral, Q8H ESTELLE, SHIRLEY Wooten, 975 mg at 08/05/20 0511    amLODIPine (NORVASC) tablet 5 mg, 5 mg, Oral, Daily, Mckinley Cueva MD, 5 mg at 08/05/20 0854    atorvastatin (LIPITOR) tablet 20 mg, 20 mg, Oral, After Joseph Solano MD, 20 mg at 08/04/20 1943    celecoxib (CeleBREX) capsule 200 mg, 200 mg, Oral, BID, Mckinley Cueva MD, 200 mg at 08/05/20 0857    gabapentin (NEURONTIN) capsule 100 mg, 100 mg, Oral, TID, SHIRLEY Carter, 100 mg at 08/05/20 1105    heparin (porcine) subcutaneous injection 5,000 Units, 5,000 Units, Subcutaneous, Q8H [address], Tasia Curtis MD, 5,000 Units at 08/05/20 0511    HYDROmorphone (DILAUDID) injection 0 2 mg, 0 2 mg, Intravenous, Q4H PRN, Junior St MD, 0 2 mg at 08/04/20 1923    lactated ringers bolus 500 mL, 500 mL, Intravenous, Once PRN, Vandana Callahan MD    lidocaine (LIDODERM) 5 % patch 2 patch, 2 patch, Topical, Daily, SHIRLEY Wooten, 2 patch at 08/05/20 0855    loratadine (CLARITIN) tablet 10 mg, 10 mg, Oral, Daily, Rajeev Conner PA-C, 10 mg at 08/05/20 0854    melatonin tablet 3 mg, 3 mg, Oral, HS, Saumya Engle MD, 3 mg at 08/04/20 2219    methocarbamol (ROBAXIN) tablet 250 mg, 250 mg, Oral, Q8H [address], Carlton Hicks PA-C, 250 mg at 08/05/20 0510    metoprolol tartrate (LOPRESSOR) tablet 25 mg, 25 mg, Oral, Q12H [address], SHIRLEY Wooten, 25 mg at 08/05/20 0854    oxyCODONE (ROXICODONE) IR tablet 2 5 mg, 2 5 mg, Oral, Q4H PRN, Isrrael Gaitan MD, 2 5 mg at 08/04/20 2219    oxyCODONE (ROXICODONE) IR tablet 5 mg, 5 mg, Oral, Q4H PRN, Isrrael Gaitan MD, 5 mg at 08/05/20 0859    polyethylene glycol (MIRALAX) packet 17 g, 17 g, Oral, Daily PRN, Fabiola Perea PA-C    senna-docusate sodium (SENOKOT S) 8 6-50 mg per tablet 1 tablet, 1 tablet, Oral, BID, Fabiola Perea PA-C, 1 tablet at 08/05/20 0855    traZODone (DESYREL) tablet 50 mg, 50 mg, Oral, HS, SHIRLEY Clement, 50 mg at 08/04/20 2219     Vitals:   Vitals:    08/05/20 0900   BP:    Pulse:    Resp:    Temp:    SpO2: 96%       Intake/Output:  I/O       08/03 0701 - 08/04 0700 08/04 0701 - 08/05 0700 08/05 0701 - 08/06 0700    P  O  680 1060 480    I V  (mL/kg)       Total Intake(mL/kg) 680 (12 5) 1060 (19 5) 480 (8 8)    Urine (mL/kg/hr) 1350 (1) 775 (0 6)     Total Output 1350 775     Net -670 +285 +480           Unmeasured Urine Occurrence 1 x 1 x            Nutrition/GI Proph/Bowel Reg: regular     Physical Exam:   Physical Exam  Constitutional:       General: She is not in acute distress  Appearance: She is not diaphoretic  HENT:      Head: Normocephalic and atraumatic  Eyes:      General:         Right eye: No discharge  Left eye: No discharge        Pupils: Pupils are equal, round, and reactive to light  Neck:      Musculoskeletal: Normal range of motion  Trachea: No tracheal deviation  Cardiovascular:      Rate and Rhythm: Normal rate and regular rhythm  Pulses: Pulses are palpable  Pulmonary:      Effort: Pulmonary effort is normal  No respiratory distress  Breath sounds: Normal breath sounds  No stridor  No wheezing or rales  Chest:      Chest wall: Tenderness (left sided rib fractures ) present  Abdominal:      General: Bowel sounds are normal  There is no distension  Palpations: Abdomen is soft  There is no mass  Tenderness: There is no abdominal tenderness  There is no guarding  Musculoskeletal:         General: No tenderness, deformity or edema  Comments: In TLSO brace    Skin:     General: Skin is warm and dry  Neurological:      Mental Status: She is alert and oriented to person, place, and time  Comments: Moves all extremities with equal strength bilaterally, clear speech    Psychiatric:         Mood and Affect: Mood and affect normal          Invasive Devices     Peripheral Intravenous Line            Peripheral IV 08/04/20 Left;Upper Arm less than 1 day          Epidural Line            Epidural Catheter 07/31/20 4 days                 Lab Results:   Results: I have personally reviewed pertinent reports   , BMP/CMP:   Lab Results   Component Value Date    SODIUM 138 08/05/2020    K 3 7 08/05/2020     08/05/2020    CO2 26 08/05/2020    BUN 14 08/05/2020    CREATININE 0 57 (L) 08/05/2020    CALCIUM 8 5 08/05/2020    EGFR 85 08/05/2020    and CBC: No results found for: WBC, HGB, HCT, MCV, PLT, ADJUSTEDWBC, MCH, MCHC, RDW, MPV, NRBC  Imaging/EKG Studies: Results: I have personally reviewed pertinent reports      Other Studies:   VTE Prophylaxis: Sequential compression device (Venodyne)  and Heparin

## 2020-08-05 NOTE — SOCIAL WORK
[facility]  Pt will d/c home today with dtr  Pt's request for a walker was denied because pt obtained a wheelchair in 2019  Pt could purchase for $99 but refused  Pt would like her medications sent to [facility] on [address]  Pt's dtr will transport home   Pt will pursue OP therapy

## 2020-08-05 NOTE — ASSESSMENT & PLAN NOTE
- T12 compression fracture  - Appreciate Neurosurgery evaluation and recommendations  Non operative management recommended  - Maintain TLSO brace whenever upright and/or out of bed, or if patient unable to tolerate secondary to multiple rib fractures can wear brace p r n   For comfort  - upright Xrays stable, neurosurgery signed off   - Continue multimodal analgesic regimen   - PT and OT evaluation and treatment as indicated - recommended for DC to home with family support

## 2020-08-05 NOTE — SOCIAL WORK
CM spoke to pt's dtr Moy Sheehan regarding d/c plan  Moy Aguila feels the pt needs rehab and want her "to go upstairs [facility]) for like a day or two and then go home"  CM explained that [facility] doesn't work in that sense, but that if she and the pt wanted a referral placed; CM can do so  Pt and family want referral  CM explained that pt's notes dont' accurately reflect a need for IP rehab but will place the referral and inform them of [facility]'s decision  Pt's dtr is nervous the pt will go home and fall

## 2020-08-13 ENCOUNTER — APPOINTMENT (OUTPATIENT)
Dept: RADIOLOGY | Age: 85
End: 2020-08-13
Payer: MEDICARE

## 2020-08-13 DIAGNOSIS — S22.080A COMPRESSION FRACTURE OF T12 VERTEBRA, INITIAL ENCOUNTER (HCC): ICD-10-CM

## 2020-08-13 DIAGNOSIS — S22.42XA CLOSED FRACTURE OF MULTIPLE RIBS OF LEFT SIDE: ICD-10-CM

## 2020-08-13 PROCEDURE — 72080 X-RAY EXAM THORACOLMB 2/> VW: CPT

## 2020-08-13 PROCEDURE — 71046 X-RAY EXAM CHEST 2 VIEWS: CPT

## 2020-08-27 DIAGNOSIS — S22.42XA CLOSED FRACTURE OF MULTIPLE RIBS OF LEFT SIDE: ICD-10-CM

## 2020-09-01 RX ORDER — LORATADINE 10 MG/1
TABLET ORAL
Qty: 30 TABLET | Refills: 0 | OUTPATIENT
Start: 2020-09-01